# Patient Record
Sex: FEMALE | Race: WHITE | NOT HISPANIC OR LATINO | URBAN - METROPOLITAN AREA
[De-identification: names, ages, dates, MRNs, and addresses within clinical notes are randomized per-mention and may not be internally consistent; named-entity substitution may affect disease eponyms.]

---

## 2017-01-25 ENCOUNTER — OUTPATIENT (OUTPATIENT)
Dept: OUTPATIENT SERVICES | Facility: HOSPITAL | Age: 62
LOS: 1 days | End: 2017-01-25
Payer: COMMERCIAL

## 2017-01-25 PROCEDURE — 76536 US EXAM OF HEAD AND NECK: CPT | Mod: 26

## 2017-01-25 PROCEDURE — 76536 US EXAM OF HEAD AND NECK: CPT

## 2017-02-22 ENCOUNTER — APPOINTMENT (OUTPATIENT)
Dept: CT IMAGING | Facility: HOSPITAL | Age: 62
End: 2017-02-22

## 2017-02-22 ENCOUNTER — OUTPATIENT (OUTPATIENT)
Dept: OUTPATIENT SERVICES | Facility: HOSPITAL | Age: 62
LOS: 1 days | End: 2017-02-22
Payer: COMMERCIAL

## 2017-02-22 PROCEDURE — 74150 CT ABDOMEN W/O CONTRAST: CPT

## 2017-03-10 ENCOUNTER — MEDICATION RENEWAL (OUTPATIENT)
Age: 62
End: 2017-03-10

## 2017-11-07 ENCOUNTER — APPOINTMENT (OUTPATIENT)
Dept: ENDOCRINOLOGY | Facility: CLINIC | Age: 62
End: 2017-11-07
Payer: COMMERCIAL

## 2017-11-07 VITALS
HEART RATE: 78 BPM | SYSTOLIC BLOOD PRESSURE: 128 MMHG | BODY MASS INDEX: 45.36 KG/M2 | HEIGHT: 59.5 IN | WEIGHT: 228 LBS | DIASTOLIC BLOOD PRESSURE: 80 MMHG | OXYGEN SATURATION: 98 %

## 2017-11-07 PROCEDURE — 99214 OFFICE O/P EST MOD 30 MIN: CPT

## 2017-11-08 LAB
25(OH)D3 SERPL-MCNC: 33 NG/ML
ALBUMIN SERPL ELPH-MCNC: 4.3 G/DL
ALP BLD-CCNC: 86 U/L
ALT SERPL-CCNC: 166 U/L
ANION GAP SERPL CALC-SCNC: 15 MMOL/L
AST SERPL-CCNC: 127 U/L
BASOPHILS # BLD AUTO: 0.03 K/UL
BASOPHILS NFR BLD AUTO: 0.3 %
BILIRUB SERPL-MCNC: 0.4 MG/DL
BUN SERPL-MCNC: 11 MG/DL
CALCIUM SERPL-MCNC: 10.2 MG/DL
CHLORIDE SERPL-SCNC: 103 MMOL/L
CHOLEST SERPL-MCNC: 216 MG/DL
CHOLEST/HDLC SERPL: 3.6 RATIO
CO2 SERPL-SCNC: 26 MMOL/L
CREAT SERPL-MCNC: 0.54 MG/DL
EOSINOPHIL # BLD AUTO: 0.13 K/UL
EOSINOPHIL NFR BLD AUTO: 1.4 %
GLUCOSE SERPL-MCNC: 89 MG/DL
HBA1C MFR BLD HPLC: 6.4 %
HCT VFR BLD CALC: 43.2 %
HDLC SERPL-MCNC: 60 MG/DL
HGB BLD-MCNC: 13.9 G/DL
IMM GRANULOCYTES NFR BLD AUTO: 0.3 %
LDLC SERPL CALC-MCNC: 142 MG/DL
LYMPHOCYTES # BLD AUTO: 3.85 K/UL
LYMPHOCYTES NFR BLD AUTO: 40.8 %
MAN DIFF?: NORMAL
MCHC RBC-ENTMCNC: 29.1 PG
MCHC RBC-ENTMCNC: 32.2 GM/DL
MCV RBC AUTO: 90.4 FL
MONOCYTES # BLD AUTO: 0.57 K/UL
MONOCYTES NFR BLD AUTO: 6 %
NEUTROPHILS # BLD AUTO: 4.82 K/UL
NEUTROPHILS NFR BLD AUTO: 51.2 %
PLATELET # BLD AUTO: 224 K/UL
POTASSIUM SERPL-SCNC: 4.6 MMOL/L
PROT SERPL-MCNC: 7.2 G/DL
RBC # BLD: 4.78 M/UL
RBC # FLD: 14.4 %
SODIUM SERPL-SCNC: 144 MMOL/L
T3 SERPL-MCNC: 162 NG/DL
T4 FREE SERPL-MCNC: 1.2 NG/DL
TRIGL SERPL-MCNC: 69 MG/DL
TSH SERPL-ACNC: 0.42 UIU/ML
VIT B12 SERPL-MCNC: 636 PG/ML
WBC # FLD AUTO: 9.43 K/UL

## 2018-03-23 ENCOUNTER — MEDICATION RENEWAL (OUTPATIENT)
Age: 63
End: 2018-03-23

## 2018-03-27 ENCOUNTER — MEDICATION RENEWAL (OUTPATIENT)
Age: 63
End: 2018-03-27

## 2018-05-07 ENCOUNTER — APPOINTMENT (OUTPATIENT)
Dept: ORTHOPEDIC SURGERY | Facility: CLINIC | Age: 63
End: 2018-05-07
Payer: COMMERCIAL

## 2018-05-07 VITALS
HEART RATE: 67 BPM | SYSTOLIC BLOOD PRESSURE: 144 MMHG | BODY MASS INDEX: 45.16 KG/M2 | DIASTOLIC BLOOD PRESSURE: 77 MMHG | HEIGHT: 60 IN | WEIGHT: 230 LBS

## 2018-05-07 DIAGNOSIS — Z78.9 OTHER SPECIFIED HEALTH STATUS: ICD-10-CM

## 2018-05-07 DIAGNOSIS — Z86.39 PERSONAL HISTORY OF OTHER ENDOCRINE, NUTRITIONAL AND METABOLIC DISEASE: ICD-10-CM

## 2018-05-07 PROCEDURE — 99214 OFFICE O/P EST MOD 30 MIN: CPT

## 2018-05-07 PROCEDURE — 72100 X-RAY EXAM L-S SPINE 2/3 VWS: CPT

## 2018-06-02 ENCOUNTER — MOBILE ON CALL (OUTPATIENT)
Age: 63
End: 2018-06-02

## 2018-06-04 ENCOUNTER — APPOINTMENT (OUTPATIENT)
Dept: ORTHOPEDIC SURGERY | Facility: CLINIC | Age: 63
End: 2018-06-04
Payer: COMMERCIAL

## 2018-06-04 DIAGNOSIS — M51.36 OTHER INTERVERTEBRAL DISC DEGENERATION, LUMBAR REGION: ICD-10-CM

## 2018-06-04 PROCEDURE — 99214 OFFICE O/P EST MOD 30 MIN: CPT

## 2018-06-13 ENCOUNTER — MEDICATION RENEWAL (OUTPATIENT)
Age: 63
End: 2018-06-13

## 2018-08-13 ENCOUNTER — APPOINTMENT (OUTPATIENT)
Dept: ORTHOPEDIC SURGERY | Facility: CLINIC | Age: 63
End: 2018-08-13

## 2018-08-31 ENCOUNTER — CLINICAL ADVICE (OUTPATIENT)
Age: 63
End: 2018-08-31

## 2018-09-11 ENCOUNTER — APPOINTMENT (OUTPATIENT)
Dept: ENDOCRINOLOGY | Facility: CLINIC | Age: 63
End: 2018-09-11
Payer: COMMERCIAL

## 2018-09-11 VITALS
OXYGEN SATURATION: 97 % | SYSTOLIC BLOOD PRESSURE: 140 MMHG | HEIGHT: 60 IN | DIASTOLIC BLOOD PRESSURE: 90 MMHG | WEIGHT: 236 LBS | HEART RATE: 82 BPM | BODY MASS INDEX: 46.33 KG/M2

## 2018-09-11 DIAGNOSIS — R53.83 OTHER FATIGUE: ICD-10-CM

## 2018-09-11 PROCEDURE — 99214 OFFICE O/P EST MOD 30 MIN: CPT

## 2018-09-18 LAB
25(OH)D3 SERPL-MCNC: 33.9 NG/ML
ALBUMIN SERPL ELPH-MCNC: 4.2 G/DL
ALDOSTERONE SERUM: 6.5 NG/DL
ALP BLD-CCNC: 78 U/L
ALT SERPL-CCNC: 101 U/L
ANION GAP SERPL CALC-SCNC: 13 MMOL/L
AST SERPL-CCNC: 83 U/L
BASOPHILS # BLD AUTO: 0.02 K/UL
BASOPHILS NFR BLD AUTO: 0.3 %
BILIRUB SERPL-MCNC: 0.4 MG/DL
BUN SERPL-MCNC: 9 MG/DL
CALCIUM SERPL-MCNC: 10.1 MG/DL
CHLORIDE SERPL-SCNC: 102 MMOL/L
CHOLEST SERPL-MCNC: 186 MG/DL
CHOLEST/HDLC SERPL: 3.7 RATIO
CO2 SERPL-SCNC: 26 MMOL/L
CREAT SERPL-MCNC: 0.5 MG/DL
DHEA-S SERPL-MCNC: 24 UG/DL
EOSINOPHIL # BLD AUTO: 0.17 K/UL
EOSINOPHIL NFR BLD AUTO: 2.2 %
FERRITIN SERPL-MCNC: 275 NG/ML
GLUCOSE SERPL-MCNC: 97 MG/DL
HBA1C MFR BLD HPLC: 6.4 %
HCT VFR BLD CALC: 42.7 %
HDLC SERPL-MCNC: 50 MG/DL
HGB BLD-MCNC: 14.2 G/DL
IMM GRANULOCYTES NFR BLD AUTO: 0.3 %
IRON SATN MFR SERPL: 26 %
IRON SERPL-MCNC: 71 UG/DL
LDLC SERPL CALC-MCNC: 121 MG/DL
LYMPHOCYTES # BLD AUTO: 3.19 K/UL
LYMPHOCYTES NFR BLD AUTO: 41.4 %
MAN DIFF?: NORMAL
MCHC RBC-ENTMCNC: 30 PG
MCHC RBC-ENTMCNC: 33.3 GM/DL
MCV RBC AUTO: 90.1 FL
METANEPHRINE, PL: 10 PG/ML
MONOCYTES # BLD AUTO: 0.4 K/UL
MONOCYTES NFR BLD AUTO: 5.2 %
NEUTROPHILS # BLD AUTO: 3.91 K/UL
NEUTROPHILS NFR BLD AUTO: 50.6 %
NORMETANEPHRINE, PL: 92 PG/ML
PLATELET # BLD AUTO: 210 K/UL
POTASSIUM SERPL-SCNC: 4.1 MMOL/L
PROT SERPL-MCNC: 7.5 G/DL
RBC # BLD: 4.74 M/UL
RBC # FLD: 14.6 %
RENIN PLASMA: <2.1 PG/ML
SODIUM SERPL-SCNC: 141 MMOL/L
T4 FREE SERPL-MCNC: 1.6 NG/DL
TIBC SERPL-MCNC: 269 UG/DL
TRIGL SERPL-MCNC: 77 MG/DL
TSH SERPL-ACNC: 0.12 UIU/ML
UIBC SERPL-MCNC: 198 UG/DL
VIT B12 SERPL-MCNC: 540 PG/ML
WBC # FLD AUTO: 7.71 K/UL

## 2018-09-26 ENCOUNTER — MEDICATION RENEWAL (OUTPATIENT)
Age: 63
End: 2018-09-26

## 2019-03-13 ENCOUNTER — APPOINTMENT (OUTPATIENT)
Dept: ENDOCRINOLOGY | Facility: CLINIC | Age: 64
End: 2019-03-13

## 2019-12-16 ENCOUNTER — MEDICATION RENEWAL (OUTPATIENT)
Age: 64
End: 2019-12-16

## 2020-06-03 ENCOUNTER — APPOINTMENT (OUTPATIENT)
Dept: ENDOCRINOLOGY | Facility: CLINIC | Age: 65
End: 2020-06-03
Payer: COMMERCIAL

## 2020-06-03 VITALS
TEMPERATURE: 98.3 F | BODY MASS INDEX: 47.12 KG/M2 | SYSTOLIC BLOOD PRESSURE: 140 MMHG | WEIGHT: 240 LBS | OXYGEN SATURATION: 96 % | DIASTOLIC BLOOD PRESSURE: 80 MMHG | HEART RATE: 74 BPM | HEIGHT: 60 IN

## 2020-06-03 DIAGNOSIS — R73.09 OTHER ABNORMAL GLUCOSE: ICD-10-CM

## 2020-06-03 PROCEDURE — 99214 OFFICE O/P EST MOD 30 MIN: CPT

## 2020-06-03 NOTE — HISTORY OF PRESENT ILLNESS
[FreeTextEntry1] : 65 y.o.female with h/o hypothyroidism, prediabetes and thyroid nodule here for follow up visit. Had fall on November 11th 2016 and broke right collar bone. No neck complaints. Was taking [place] thyroid 30 mg daily and switched to Synthroid 75 mcg daily since March 2018. Had FNA in March 2014 of left dominant 3.9 cm nodule c/w adenomatous nodular goiter. Last sonogram in January 2017 showed stable 3.7 cm left complex thyroid nodule. C/o fatigue. Reports cold intolerance. Normal bowel movements. No hair loss and no skin complaints. Reports puffiness around the eyes at night.  No exercise. C/o right low back pain. Tried PT but no help. \par \par Also has prediabetes. Reports watches diet and but not losing weight. No exercise. Reduced portions. \par \par Also diagnosed with right adrenal nodule measuring 4.3 cm c/w adrenal myolipoma on MRI from 5/8/15 and normal hormonal work up in 2015 and 2018. Had CT scan in 2/2017 showed stable right adrenal adenoma 3.5 cm. \par \par Also h/o osteopenia, no fractures recently. Taking vitamin D3 5,000 IU every few days. Eats leafy greens. No dairy. \par

## 2020-06-03 NOTE — REVIEW OF SYSTEMS
[Fatigue] : fatigue [Recent Weight Gain (___ Lbs)] : recent weight gain: [unfilled] lbs [Polyuria] : no polyuria [Back Pain] : back pain [Hair Loss] : no hair loss [Dizziness] : dizziness [Polydipsia] : no polydipsia [Cold Intolerance] : cold intolerance [Swelling] : swelling [Negative] : Respiratory

## 2020-06-03 NOTE — ASSESSMENT
[FreeTextEntry1] : 65 y.o. female with h/o hypothyroidism, thyroid nodule, prediabetes, adrenal nodule and osteopenia.\par 1. Hypothyroidism- Will check TFTs and adjust Synthroid accordingly.\par 2. Thyroid nodule- Will check thyroid ultrasound. If stable, will continue to monitor.\par 3. Prediabetes- Encouraged carbohydrate consistent diet and exercise. Will check CMP and HBa1c. \par 4. Adrenal nodule- Hormonal work up is normal. Will repeat CT scan now.\par 5. Osteopenia- DEXA scan performed in 12/2016 shows spine -1.3 and left femoral neck -0.4 with total hip 0.9. Patient is at average risk of fracture. Recommend monitoring and repeat DEXA scan at next visit since patient unable to wait today. Encouraged weight bearing activity. \par 6. Fatigue- Will check CBC, vitamin B12 and vitamin D. \par \par Follow up in 6 months, if stable\par Follow up with ortho for back pain [Carbohydrate Consistent Diet] : carbohydrate consistent diet

## 2020-06-03 NOTE — REASON FOR VISIT
[Follow - Up] : a follow-up visit [Hypothyroidism] : hypothyroidism [Thyroid nodule/ MNG] : thyroid nodule/ MNG

## 2020-06-03 NOTE — PHYSICAL EXAM
[Alert] : alert [No Acute Distress] : no acute distress [Normal Sclera/Conjunctiva] : normal sclera/conjunctiva [EOMI] : extra ocular movement intact [No LAD] : no lymphadenopathy [No Respiratory Distress] : no respiratory distress [Normal S1, S2] : normal S1 and S2 [Clear to Auscultation] : lungs were clear to auscultation bilaterally [Normal Bowel Sounds] : normal bowel sounds [Regular Rhythm] : with a regular rhythm [Not Tender] : non-tender [Not Distended] : not distended [Soft] : abdomen soft [Normal Anterior Cervical Nodes] : no anterior cervical lymphadenopathy [Kyphosis] : no kyphosis present [No Stigmata of Cushings Syndrome] : no stigmata of Cushings Syndrome [No Clubbing, Cyanosis] : no clubbing  or cyanosis of the fingernails [No Rash] : no rash [Abdominal Striae] : no abdominal striae [Acanthosis Nigricans] : no acanthosis nigricans [Normal Reflexes] : deep tendon reflexes were 2+ and symmetric [Normal Affect] : the affect was normal [Normal Mood] : the mood was normal [de-identified] : left lobe nodule [de-identified] : mild edema b/l

## 2020-06-08 LAB
25(OH)D3 SERPL-MCNC: 41.2 NG/ML
ALBUMIN SERPL ELPH-MCNC: 4.4 G/DL
ALP BLD-CCNC: 96 U/L
ALT SERPL-CCNC: 120 U/L
ANION GAP SERPL CALC-SCNC: 13 MMOL/L
AST SERPL-CCNC: 85 U/L
BASOPHILS # BLD AUTO: 0.05 K/UL
BASOPHILS NFR BLD AUTO: 0.5 %
BILIRUB SERPL-MCNC: 0.5 MG/DL
BUN SERPL-MCNC: 13 MG/DL
CALCIUM SERPL-MCNC: 10.3 MG/DL
CHLORIDE SERPL-SCNC: 103 MMOL/L
CHOLEST SERPL-MCNC: 208 MG/DL
CHOLEST/HDLC SERPL: 3.6 RATIO
CO2 SERPL-SCNC: 24 MMOL/L
CREAT SERPL-MCNC: 0.53 MG/DL
EOSINOPHIL # BLD AUTO: 0.16 K/UL
EOSINOPHIL NFR BLD AUTO: 1.6 %
ESTIMATED AVERAGE GLUCOSE: 157 MG/DL
FERRITIN SERPL-MCNC: 245 NG/ML
GLUCOSE SERPL-MCNC: 119 MG/DL
HBA1C MFR BLD HPLC: 7.1 %
HCT VFR BLD CALC: 47.1 %
HDLC SERPL-MCNC: 58 MG/DL
HGB BLD-MCNC: 14.8 G/DL
IMM GRANULOCYTES NFR BLD AUTO: 0.2 %
IRON SATN MFR SERPL: 26 %
IRON SERPL-MCNC: 78 UG/DL
LDLC SERPL CALC-MCNC: 136 MG/DL
LYMPHOCYTES # BLD AUTO: 4.21 K/UL
LYMPHOCYTES NFR BLD AUTO: 41.8 %
MAGNESIUM SERPL-MCNC: 2.1 MG/DL
MAN DIFF?: NORMAL
MCHC RBC-ENTMCNC: 28.9 PG
MCHC RBC-ENTMCNC: 31.4 GM/DL
MCV RBC AUTO: 92 FL
MONOCYTES # BLD AUTO: 0.62 K/UL
MONOCYTES NFR BLD AUTO: 6.2 %
NEUTROPHILS # BLD AUTO: 5.01 K/UL
NEUTROPHILS NFR BLD AUTO: 49.7 %
PLATELET # BLD AUTO: 239 K/UL
POTASSIUM SERPL-SCNC: 4.7 MMOL/L
PROT SERPL-MCNC: 7.2 G/DL
RBC # BLD: 5.12 M/UL
RBC # FLD: 14.5 %
SODIUM SERPL-SCNC: 140 MMOL/L
T4 FREE SERPL-MCNC: 1.5 NG/DL
TIBC SERPL-MCNC: 298 UG/DL
TRIGL SERPL-MCNC: 75 MG/DL
TSH SERPL-ACNC: 0.17 UIU/ML
UIBC SERPL-MCNC: 219 UG/DL
VIT B12 SERPL-MCNC: 590 PG/ML
WBC # FLD AUTO: 10.07 K/UL

## 2020-12-09 ENCOUNTER — APPOINTMENT (OUTPATIENT)
Dept: ENDOCRINOLOGY | Facility: CLINIC | Age: 65
End: 2020-12-09

## 2021-04-23 ENCOUNTER — APPOINTMENT (OUTPATIENT)
Dept: ENDOCRINOLOGY | Facility: CLINIC | Age: 66
End: 2021-04-23
Payer: MEDICARE

## 2021-04-23 VITALS — OXYGEN SATURATION: 97 % | SYSTOLIC BLOOD PRESSURE: 140 MMHG | HEART RATE: 81 BPM | DIASTOLIC BLOOD PRESSURE: 80 MMHG

## 2021-04-23 VITALS — HEIGHT: 58.5 IN | BODY MASS INDEX: 49.85 KG/M2 | TEMPERATURE: 98.2 F | WEIGHT: 244 LBS

## 2021-04-23 LAB
GLUCOSE BLDC GLUCOMTR-MCNC: 113
HBA1C MFR BLD HPLC: 7.3

## 2021-04-23 PROCEDURE — ZZZZZ: CPT

## 2021-04-23 PROCEDURE — 83036 HEMOGLOBIN GLYCOSYLATED A1C: CPT | Mod: QW

## 2021-04-23 PROCEDURE — 77080 DXA BONE DENSITY AXIAL: CPT

## 2021-04-23 PROCEDURE — 99214 OFFICE O/P EST MOD 30 MIN: CPT | Mod: 25

## 2021-04-23 PROCEDURE — 82962 GLUCOSE BLOOD TEST: CPT

## 2021-04-23 RX ORDER — MELOXICAM 15 MG/1
15 TABLET ORAL
Qty: 30 | Refills: 1 | Status: DISCONTINUED | COMMUNITY
Start: 2018-05-07 | End: 2021-04-23

## 2021-04-23 NOTE — PHYSICAL EXAM
[Alert] : alert [No Acute Distress] : no acute distress [Normal Sclera/Conjunctiva] : normal sclera/conjunctiva [EOMI] : extra ocular movement intact [No LAD] : no lymphadenopathy [No Respiratory Distress] : no respiratory distress [Clear to Auscultation] : lungs were clear to auscultation bilaterally [Normal S1, S2] : normal S1 and S2 [Regular Rhythm] : with a regular rhythm [Normal Bowel Sounds] : normal bowel sounds [Not Tender] : non-tender [Not Distended] : not distended [Soft] : abdomen soft [Normal Anterior Cervical Nodes] : no anterior cervical lymphadenopathy [No Stigmata of Cushings Syndrome] : no stigmata of Cushings Syndrome [No Clubbing, Cyanosis] : no clubbing  or cyanosis of the fingernails [No Rash] : no rash [Normal Reflexes] : deep tendon reflexes were 2+ and symmetric [Normal Affect] : the affect was normal [Normal Mood] : the mood was normal [No Spinal Tenderness] : no spinal tenderness [Right Foot Was Examined] : right foot ~C was examined [Left Foot Was Examined] : left foot ~C was examined [Normal] : normal [2+] : 2+ in the dorsalis pedis [Kyphosis] : no kyphosis present [Abdominal Striae] : no abdominal striae [Acanthosis Nigricans] : no acanthosis nigricans [Diminished Throughout Both Feet] : normal tactile sensation with monofilament testing throughout both feet [de-identified] : left lobe nodule [de-identified] : mild edema b/l

## 2021-04-23 NOTE — ASSESSMENT
[Carbohydrate Consistent Diet] : carbohydrate consistent diet [Diabetes Foot Care] : diabetes foot care [Long Term Vascular Complications] : long term vascular complications of diabetes [Self Monitoring of Blood Glucose] : self monitoring of blood glucose [Retinopathy Screening] : Patient was referred to ophthalmology for retinopathy screening [FreeTextEntry1] : 66 y.o. female with h/o hypothyroidism, thyroid nodule, Type 2 DM, adrenal nodule and osteopenia.\par \par 1. Hypothyroidism- Will check TFTs and adjust Synthroid accordingly.\par \par 2. Thyroid nodule- Will check thyroid ultrasound. If stable, will continue to monitor.\par \par 3. Type 2 DM- Discussed pathophysiology and reviewed Hba1c and blood glucose goals. Fair control with Hba1c of 7.3% today. Glucometer ordered today. Recommend starting Metformin 500 mg BID. Encouraged carbohydrate consistent diet and exercise. Will check CMP and urine microalb/cr ratio. Recommend follow up with ophthalmology. \par \par 4. Adrenal nodule- Hormonal work up is normal. Will repeat CT scan now.\par \par 5. Osteopenia- DEXA scan performed today shows spine -1.3 which is stable and left femoral neck -1.9 with 20% decrease however  total hip 0.8 which is stable. Patient is at average risk of fracture. Recommend monitoring and repeat DEXA scan in 1 year given more significant decrease at femoral though given stability of total hip unlikely this is accurate. Encouraged weight bearing activity. Discussed appropriate calcium and vitamin D intake. Will check 25 vitamin D level.\par \par 6. Fatigue- Will check CBC and vitamin B12 \par \par Follow up in 4 months

## 2021-04-23 NOTE — HISTORY OF PRESENT ILLNESS
[FreeTextEntry1] : 65 y.o.female with h/o hypothyroidism, Type 2 DM diagnosed in June 2020 and thyroid nodule here for follow up visit. Had fall on November 11th 2016 and broke right collar bone. No neck complaints. Was taking [place] thyroid 30 mg daily and switched to Synthroid 75 mcg daily 6 days per week. She developed hives with generic Synthroid. Had FNA in March 2014 of left dominant 3.9 cm nodule c/w adenomatous nodular goiter. Last sonogram in January 2017 showed stable 3.7 cm left complex thyroid nodule. C/o fatigue. Reports cold intolerance. Normal bowel movements. No hair loss and no skin complaints.  C/o right low back pain. Tried PT but no help. \par \par Also has Type 2 DM diagnosed in June 2020. Never started metformin. Reports watches diet and but not losing weight. No exercise. No bread. Past due for ophthalmology. No foot complaints. \par \par Also diagnosed with right adrenal nodule measuring 4.3 cm c/w adrenal myolipoma on MRI from 5/8/15 and normal hormonal work up in 2015 and 2018. Had CT scan in 2/2017 showed stable right adrenal adenoma 3.5 cm. \par \par Also h/o osteopenia, no falls or fractures recently. Taking vitamin D3 5,000 IU daily. Eats leafy greens. No dairy.  \par DEXA scan performed in 12/2016 shows spine -1.3 and left femoral neck -0.4 with total hip 0.9.\par

## 2021-04-28 LAB
BASOPHILS # BLD AUTO: 0.06 K/UL
BASOPHILS NFR BLD AUTO: 0.7 %
EOSINOPHIL # BLD AUTO: 0.18 K/UL
EOSINOPHIL NFR BLD AUTO: 2 %
HCT VFR BLD CALC: 48.1 %
HGB BLD-MCNC: 14.7 G/DL
IMM GRANULOCYTES NFR BLD AUTO: 0.3 %
LYMPHOCYTES # BLD AUTO: 3.53 K/UL
LYMPHOCYTES NFR BLD AUTO: 39.8 %
MAN DIFF?: NORMAL
MCHC RBC-ENTMCNC: 28.6 PG
MCHC RBC-ENTMCNC: 30.6 GM/DL
MCV RBC AUTO: 93.6 FL
MONOCYTES # BLD AUTO: 0.58 K/UL
MONOCYTES NFR BLD AUTO: 6.5 %
NEUTROPHILS # BLD AUTO: 4.49 K/UL
NEUTROPHILS NFR BLD AUTO: 50.7 %
PLATELET # BLD AUTO: 234 K/UL
RBC # BLD: 5.14 M/UL
RBC # FLD: 14.7 %
WBC # FLD AUTO: 8.87 K/UL

## 2021-04-28 RX ORDER — BLOOD SUGAR DIAGNOSTIC
STRIP MISCELLANEOUS
Qty: 100 | Refills: 3 | Status: ACTIVE | COMMUNITY
Start: 2021-04-23 | End: 1900-01-01

## 2021-04-28 RX ORDER — BLOOD-GLUCOSE METER
W/DEVICE EACH MISCELLANEOUS
Qty: 1 | Refills: 0 | Status: ACTIVE | COMMUNITY
Start: 2021-04-23 | End: 1900-01-01

## 2021-04-28 RX ORDER — LANCETS
EACH MISCELLANEOUS
Qty: 102 | Refills: 3 | Status: ACTIVE | COMMUNITY
Start: 2021-04-23 | End: 1900-01-01

## 2021-05-03 ENCOUNTER — RESULT REVIEW (OUTPATIENT)
Age: 66
End: 2021-05-03

## 2021-05-03 ENCOUNTER — NON-APPOINTMENT (OUTPATIENT)
Age: 66
End: 2021-05-03

## 2021-05-03 ENCOUNTER — APPOINTMENT (OUTPATIENT)
Dept: ULTRASOUND IMAGING | Facility: HOSPITAL | Age: 66
End: 2021-05-03
Payer: MEDICARE

## 2021-05-03 ENCOUNTER — APPOINTMENT (OUTPATIENT)
Dept: CT IMAGING | Facility: HOSPITAL | Age: 66
End: 2021-05-03
Payer: MEDICARE

## 2021-05-03 ENCOUNTER — OUTPATIENT (OUTPATIENT)
Dept: OUTPATIENT SERVICES | Facility: HOSPITAL | Age: 66
LOS: 1 days | End: 2021-05-03
Payer: MEDICARE

## 2021-05-03 DIAGNOSIS — Z00.8 ENCOUNTER FOR OTHER GENERAL EXAMINATION: ICD-10-CM

## 2021-05-03 PROCEDURE — 76536 US EXAM OF HEAD AND NECK: CPT | Mod: 26

## 2021-05-03 PROCEDURE — 74150 CT ABDOMEN W/O CONTRAST: CPT | Mod: 26,MH

## 2021-05-03 PROCEDURE — 74150 CT ABDOMEN W/O CONTRAST: CPT

## 2021-05-03 PROCEDURE — 76536 US EXAM OF HEAD AND NECK: CPT

## 2021-10-10 ENCOUNTER — TRANSCRIPTION ENCOUNTER (OUTPATIENT)
Age: 66
End: 2021-10-10

## 2021-10-30 ENCOUNTER — TRANSCRIPTION ENCOUNTER (OUTPATIENT)
Age: 66
End: 2021-10-30

## 2021-11-17 ENCOUNTER — APPOINTMENT (OUTPATIENT)
Dept: ENDOCRINOLOGY | Facility: CLINIC | Age: 66
End: 2021-11-17
Payer: MEDICARE

## 2021-11-17 VITALS
HEART RATE: 79 BPM | DIASTOLIC BLOOD PRESSURE: 88 MMHG | SYSTOLIC BLOOD PRESSURE: 128 MMHG | BODY MASS INDEX: 48.49 KG/M2 | WEIGHT: 236 LBS | OXYGEN SATURATION: 98 % | TEMPERATURE: 98.1 F

## 2021-11-17 LAB — HBA1C MFR BLD HPLC: 6.3

## 2021-11-17 PROCEDURE — 99215 OFFICE O/P EST HI 40 MIN: CPT | Mod: 25

## 2021-11-17 PROCEDURE — 83036 HEMOGLOBIN GLYCOSYLATED A1C: CPT | Mod: QW

## 2021-11-17 NOTE — PHYSICAL EXAM
[Alert] : alert [No Acute Distress] : no acute distress [Normal Sclera/Conjunctiva] : normal sclera/conjunctiva [EOMI] : extra ocular movement intact [No LAD] : no lymphadenopathy [No Respiratory Distress] : no respiratory distress [Clear to Auscultation] : lungs were clear to auscultation bilaterally [Normal S1, S2] : normal S1 and S2 [Regular Rhythm] : with a regular rhythm [Normal Bowel Sounds] : normal bowel sounds [Not Tender] : non-tender [Not Distended] : not distended [Soft] : abdomen soft [Normal Anterior Cervical Nodes] : no anterior cervical lymphadenopathy [No Spinal Tenderness] : no spinal tenderness [No Stigmata of Cushings Syndrome] : no stigmata of Cushings Syndrome [No Clubbing, Cyanosis] : no clubbing  or cyanosis of the fingernails [No Rash] : no rash [Right foot was examined, including] : right foot ~C was examined, including visual inspection with sensory and pulse exams [Left foot was examined, including] : left foot ~C was examined, including visual inspection with sensory and pulse exams [Normal] : normal [2+] : 2+ in the dorsalis pedis [Normal Reflexes] : deep tendon reflexes were 2+ and symmetric [Normal Affect] : the affect was normal [Normal Mood] : the mood was normal [Kyphosis] : no kyphosis present [Abdominal Striae] : no abdominal striae [Acanthosis Nigricans] : no acanthosis nigricans [Diminished Throughout Both Feet] : normal tactile sensation with monofilament testing throughout both feet [de-identified] : left thyroid lobe nodule [de-identified] : mild edema b/l

## 2021-11-17 NOTE — REVIEW OF SYSTEMS
[Fatigue] : fatigue [Back Pain] : back pain [Cold Intolerance] : cold intolerance [Swelling] : swelling [Recent Weight Gain (___ Lbs)] : no recent weight gain [Recent Weight Loss (___ Lbs)] : recent weight loss: [unfilled] lbs [Eye Pain] : pain [Polyuria] : no polyuria [Hair Loss] : no hair loss [Polydipsia] : no polydipsia [Negative] : Neurological

## 2021-11-17 NOTE — HISTORY OF PRESENT ILLNESS
[FreeTextEntry1] : 66 y.o.female with h/o hypothyroidism, Type 2 DM diagnosed in June 2020 and thyroid nodule here for follow up visit. Had fall on November 11th 2016 and broke right collar bone. No neck complaints. Was taking [place] thyroid 30 mg daily and switched to Synthroid 75 mcg daily 6 days per week. She developed hives with generic Synthroid. Had FNA in March 2014 of left dominant 3.9 cm nodule c/w adenomatous nodular goiter. Thyroid ultrasound in January 2017 showed stable 3.7 cm left complex thyroid nodule. Thyroid ultrasound in May 2021 showed stable left mid to lower pole nodule measuring 3.5 cm. No abnormal LNs are seen. . C/o fatigue. Reports cold intolerance. Normal bowel movements. No hair loss and no skin complaints.  C/o right low back pain. Tried PT but no help. \par \par Also has Type 2 DM diagnosed in June 2020. Taking metformin 500 mg po daily. Does intermittent fasting. Reports watches diet and did lose weight. No exercise. No bread. Past due for ophthalmology. Does report eye lid pain b/l. No foot complaints. \par \par Also diagnosed with right adrenal nodule measuring 4.3 cm c/w adrenal myolipoma on MRI from 5/8/15 and normal hormonal work up in 2015 and 2018. Had CT scan in 2/2017 showed stable right adrenal adenoma 3.5 cm. had CT scan in May 2021 showed stable right 3.5 cm adrenal lesion consistent with myelolipoma and stable right 1.4 cm cortical adenoma. \par \par Also h/o osteopenia, no falls or fractures recently. Stop taking vitamin D3 5,000 IU daily because of back pain. Eats leafy greens. No dairy.  \par DEXA scan performed in 12/2016 shows spine -1.3 and left femoral neck -0.4 with total hip 0.9.\par DEXA scan performed in [address]21 shows spine -1.3 which is stable and left femoral neck -1.9 with 20% decrease however total hip 0.8 is stable.

## 2021-11-22 LAB
25(OH)D3 SERPL-MCNC: 27.8 NG/ML
ALBUMIN SERPL ELPH-MCNC: 4.3 G/DL
ALP BLD-CCNC: 89 U/L
ALT SERPL-CCNC: 70 U/L
ANION GAP SERPL CALC-SCNC: 12 MMOL/L
AST SERPL-CCNC: 49 U/L
BASOPHILS # BLD AUTO: 0.05 K/UL
BASOPHILS NFR BLD AUTO: 0.5 %
BILIRUB SERPL-MCNC: 0.2 MG/DL
BUN SERPL-MCNC: 18 MG/DL
CALCIUM SERPL-MCNC: 10 MG/DL
CHLORIDE SERPL-SCNC: 104 MMOL/L
CHOLEST SERPL-MCNC: 210 MG/DL
CO2 SERPL-SCNC: 23 MMOL/L
CREAT SERPL-MCNC: 0.67 MG/DL
CREAT SPEC-SCNC: 27 MG/DL
EOSINOPHIL # BLD AUTO: 0.21 K/UL
EOSINOPHIL NFR BLD AUTO: 2 %
FOLATE SERPL-MCNC: 18.5 NG/ML
GLUCOSE SERPL-MCNC: 112 MG/DL
HCT VFR BLD CALC: 45.1 %
HDLC SERPL-MCNC: 57 MG/DL
HGB BLD-MCNC: 14.1 G/DL
IMM GRANULOCYTES NFR BLD AUTO: 0.3 %
LDLC SERPL CALC-MCNC: 133 MG/DL
LYMPHOCYTES # BLD AUTO: 4.1 K/UL
LYMPHOCYTES NFR BLD AUTO: 38.9 %
MAN DIFF?: NORMAL
MCHC RBC-ENTMCNC: 28.5 PG
MCHC RBC-ENTMCNC: 31.3 GM/DL
MCV RBC AUTO: 91.1 FL
MICROALBUMIN 24H UR DL<=1MG/L-MCNC: <1.2 MG/DL
MICROALBUMIN/CREAT 24H UR-RTO: NORMAL MG/G
MONOCYTES # BLD AUTO: 0.63 K/UL
MONOCYTES NFR BLD AUTO: 6 %
NEUTROPHILS # BLD AUTO: 5.52 K/UL
NEUTROPHILS NFR BLD AUTO: 52.3 %
NONHDLC SERPL-MCNC: 153 MG/DL
PLATELET # BLD AUTO: 248 K/UL
POTASSIUM SERPL-SCNC: 4.2 MMOL/L
PROT SERPL-MCNC: 6.7 G/DL
RBC # BLD: 4.95 M/UL
RBC # FLD: 15 %
SODIUM SERPL-SCNC: 139 MMOL/L
T4 FREE SERPL-MCNC: 1.4 NG/DL
TRIGL SERPL-MCNC: 103 MG/DL
TSH SERPL-ACNC: 0.57 UIU/ML
VIT B12 SERPL-MCNC: 942 PG/ML
WBC # FLD AUTO: 10.54 K/UL

## 2022-03-30 ENCOUNTER — APPOINTMENT (OUTPATIENT)
Dept: OPHTHALMOLOGY | Facility: CLINIC | Age: 67
End: 2022-03-30
Payer: MEDICARE

## 2022-03-30 ENCOUNTER — NON-APPOINTMENT (OUTPATIENT)
Age: 67
End: 2022-03-30

## 2022-03-30 PROCEDURE — 92004 COMPRE OPH EXAM NEW PT 1/>: CPT

## 2022-05-20 ENCOUNTER — APPOINTMENT (OUTPATIENT)
Dept: ENDOCRINOLOGY | Facility: CLINIC | Age: 67
End: 2022-05-20
Payer: MEDICARE

## 2022-05-20 VITALS
WEIGHT: 235 LBS | TEMPERATURE: 97.9 F | DIASTOLIC BLOOD PRESSURE: 80 MMHG | HEART RATE: 74 BPM | HEIGHT: 58.5 IN | OXYGEN SATURATION: 95 % | BODY MASS INDEX: 48.01 KG/M2 | SYSTOLIC BLOOD PRESSURE: 120 MMHG

## 2022-05-20 LAB
GLUCOSE BLDC GLUCOMTR-MCNC: 107
HBA1C MFR BLD HPLC: 6.2

## 2022-05-20 PROCEDURE — 83036 HEMOGLOBIN GLYCOSYLATED A1C: CPT | Mod: QW

## 2022-05-20 PROCEDURE — 82962 GLUCOSE BLOOD TEST: CPT

## 2022-05-20 PROCEDURE — 99214 OFFICE O/P EST MOD 30 MIN: CPT | Mod: 25

## 2022-05-21 NOTE — REVIEW OF SYSTEMS
[Fatigue] : fatigue [Recent Weight Loss (___ Lbs)] : recent weight loss: [unfilled] lbs [Eye Pain] : pain [Back Pain] : back pain [Cold Intolerance] : cold intolerance [Swelling] : swelling [Negative] : Neurological [Recent Weight Gain (___ Lbs)] : no recent weight gain [Polyuria] : no polyuria [Hair Loss] : no hair loss [Polydipsia] : no polydipsia

## 2022-05-21 NOTE — HISTORY OF PRESENT ILLNESS
[FreeTextEntry1] : 67 y.o.female with h/o hypothyroidism, Type 2 DM diagnosed in June 2020, adrenal nodule and thyroid nodule here for follow up visit. Had fall on November 11th 2016 and broke right collar bone. No neck complaints. Was taking [place] thyroid 30 mg daily and switched to Synthroid 75 mcg daily 6 days per week. She developed hives with generic Synthroid. Had FNA in March 2014 of left dominant 3.9 cm nodule c/w adenomatous nodular goiter. Thyroid ultrasound in January 2017 showed stable 3.7 cm left complex thyroid nodule. Thyroid ultrasound in May 2021 showed stable left mid to lower pole nodule measuring 3.5 cm. No abnormal LNs are seen. C/o fatigue. Reports cold intolerance. Normal bowel movements. No hair loss and no skin complaints.  C/o right low back pain. Tried PT but no help. \par \[facility] Also has Type 2 DM diagnosed in June 2020. Taking metformin 500 mg po daily (reduced dose because felt dizzy). Does intermittent fasting. Reports watches diet and did lose weight. No exercise. No bread. UTD with ophthalmology in March 2022 and no retinopathy. Does report eye lid pain b/l. No foot complaints. No proteinuria. \par \[facility] Also diagnosed with right adrenal nodule measuring 4.3 cm c/w adrenal myolipoma on MRI from 5/8/15 and normal hormonal work up in 2015 and 2018. Had CT scan in 2/2017 showed stable right adrenal adenoma 3.5 cm. She had CT scan in May 2021 showed stable right 3.5 cm adrenal lesion consistent with myelolipoma and stable right 1.4 cm cortical adenoma. \par \par Also h/o osteopenia, no falls or fractures recently. She is taking vitamin D3 5,000 IU once weekly Eats leafy greens. No dairy.  \par \par DEXA scan performed in 12/2016 shows spine -1.3 and left femoral neck -0.4 with total hip 0.9.\par DEXA scan performed in 4/2021 shows spine -1.3 which is stable and left femoral neck -1.9 with 20% decrease however total hip 0.8 is stable.

## 2022-05-21 NOTE — ASSESSMENT
[Carbohydrate Consistent Diet] : carbohydrate consistent diet [Diabetes Foot Care] : diabetes foot care [Long Term Vascular Complications] : long term vascular complications of diabetes [Self Monitoring of Blood Glucose] : self monitoring of blood glucose [Retinopathy Screening] : Patient was referred to ophthalmology for retinopathy screening [FreeTextEntry1] : 67 y.o. female with h/o hypothyroidism, thyroid nodule, Type 2 DM, adrenal nodule and osteopenia.\par \par 1. Hypothyroidism- Will check TFTs and adjust Synthroid accordingly.\par \par 2. Thyroid nodule- Will check thyroid ultrasound in 2024 given nodule stability. Will continue to monitor.\par \par 3. Type 2 DM- Discussed pathophysiology and reviewed Hba1c and blood glucose goals. Good control with Hba1c of 6.2% today. Will continue Metformin 500 mg daily. Encouraged a carbohydrate consistent diet and exercise. Will check CMP and urine alb/cr ratio. UTD with ophthalmology. \par \par 4. Adrenal nodule- Hormonal work up is normal. Will repeat CT scan in 2024.\par \par 5. Osteopenia- DEXA scan performed in April 2021 shows spine -1.3 which is stable and left femoral neck -1.9 with 20% decrease however total hip 0.8 which is stable. Patient is at average risk of fracture. Recommend monitoring and repeat DEXA scan in in 2023. Encouraged weight bearing activity. Discussed appropriate calcium and vitamin D intake. Will check 25 vitamin D level.\par \par 6. Vitamin B12 def- Will check CBC and vitamin B12 \par \par Follow up in 4 to 6 months

## 2022-05-21 NOTE — REASON FOR VISIT
[Follow - Up] : a follow-up visit [DM Type 2] : DM Type 2 [Hypothyroidism] : hypothyroidism [Thyroid nodule/ MNG] : thyroid nodule/ MNG [Adrenal Evaluation/Adrenal Disorder] : adrenal evaluation/adrenal disorder

## 2022-05-21 NOTE — PHYSICAL EXAM
[Alert] : alert [No Acute Distress] : no acute distress [Normal Sclera/Conjunctiva] : normal sclera/conjunctiva [EOMI] : extra ocular movement intact [No LAD] : no lymphadenopathy [No Respiratory Distress] : no respiratory distress [Clear to Auscultation] : lungs were clear to auscultation bilaterally [Normal S1, S2] : normal S1 and S2 [Regular Rhythm] : with a regular rhythm [Normal Bowel Sounds] : normal bowel sounds [Not Tender] : non-tender [Not Distended] : not distended [Soft] : abdomen soft [Normal Anterior Cervical Nodes] : no anterior cervical lymphadenopathy [No Spinal Tenderness] : no spinal tenderness [No Stigmata of Cushings Syndrome] : no stigmata of Cushings Syndrome [No Clubbing, Cyanosis] : no clubbing  or cyanosis of the fingernails [No Rash] : no rash [Normal] : normal [2+] : 2+ in the dorsalis pedis [Normal Reflexes] : deep tendon reflexes were 2+ and symmetric [Normal Affect] : the affect was normal [Normal Mood] : the mood was normal [Kyphosis] : no kyphosis present [Abdominal Striae] : no abdominal striae [Acanthosis Nigricans] : no acanthosis nigricans [Right foot was examined, including] : right foot ~C was examined, including visual inspection with sensory and pulse exams [Left foot was examined, including] : left foot ~C was examined, including visual inspection with sensory and pulse exams [Diminished Throughout Both Feet] : normal tactile sensation with monofilament testing throughout both feet [de-identified] : left thyroid lobe nodule [de-identified] : mild edema b/l

## 2022-05-25 LAB
25(OH)D3 SERPL-MCNC: 29.6 NG/ML
ALBUMIN SERPL ELPH-MCNC: 4.5 G/DL
ALP BLD-CCNC: 85 U/L
ALT SERPL-CCNC: 107 U/L
ANION GAP SERPL CALC-SCNC: 13 MMOL/L
AST SERPL-CCNC: 65 U/L
BASOPHILS # BLD AUTO: 0.05 K/UL
BASOPHILS NFR BLD AUTO: 0.5 %
BILIRUB SERPL-MCNC: 0.4 MG/DL
BUN SERPL-MCNC: 13 MG/DL
CALCIUM SERPL-MCNC: 10.8 MG/DL
CHLORIDE SERPL-SCNC: 105 MMOL/L
CHOLEST SERPL-MCNC: 220 MG/DL
CO2 SERPL-SCNC: 25 MMOL/L
CREAT SERPL-MCNC: 0.51 MG/DL
CREAT SPEC-SCNC: 68 MG/DL
EGFR: 103 ML/MIN/1.73M2
EOSINOPHIL # BLD AUTO: 0.26 K/UL
EOSINOPHIL NFR BLD AUTO: 2.8 %
FOLATE SERPL-MCNC: >20 NG/ML
GLUCOSE SERPL-MCNC: 98 MG/DL
HCT VFR BLD CALC: 46 %
HDLC SERPL-MCNC: 62 MG/DL
HGB BLD-MCNC: 14.3 G/DL
IMM GRANULOCYTES NFR BLD AUTO: 0.4 %
LDLC SERPL CALC-MCNC: 138 MG/DL
LYMPHOCYTES # BLD AUTO: 3.81 K/UL
LYMPHOCYTES NFR BLD AUTO: 40.5 %
MAN DIFF?: NORMAL
MCHC RBC-ENTMCNC: 28.1 PG
MCHC RBC-ENTMCNC: 31.1 GM/DL
MCV RBC AUTO: 90.4 FL
MICROALBUMIN 24H UR DL<=1MG/L-MCNC: 2.9 MG/DL
MICROALBUMIN/CREAT 24H UR-RTO: 43 MG/G
MONOCYTES # BLD AUTO: 0.54 K/UL
MONOCYTES NFR BLD AUTO: 5.7 %
NEUTROPHILS # BLD AUTO: 4.71 K/UL
NEUTROPHILS NFR BLD AUTO: 50.1 %
NONHDLC SERPL-MCNC: 158 MG/DL
PLATELET # BLD AUTO: 234 K/UL
POTASSIUM SERPL-SCNC: 4.8 MMOL/L
PROT SERPL-MCNC: 7.1 G/DL
RBC # BLD: 5.09 M/UL
RBC # FLD: 14.4 %
SODIUM SERPL-SCNC: 142 MMOL/L
T4 FREE SERPL-MCNC: 1.4 NG/DL
TRIGL SERPL-MCNC: 99 MG/DL
TSH SERPL-ACNC: 0.39 UIU/ML
VIT B12 SERPL-MCNC: 1595 PG/ML
WBC # FLD AUTO: 9.41 K/UL

## 2022-09-15 ENCOUNTER — RX RENEWAL (OUTPATIENT)
Age: 67
End: 2022-09-15

## 2022-12-04 ENCOUNTER — RX RENEWAL (OUTPATIENT)
Age: 67
End: 2022-12-04

## 2022-12-07 ENCOUNTER — APPOINTMENT (OUTPATIENT)
Dept: ENDOCRINOLOGY | Facility: CLINIC | Age: 67
End: 2022-12-07

## 2022-12-07 VITALS
WEIGHT: 230 LBS | DIASTOLIC BLOOD PRESSURE: 72 MMHG | OXYGEN SATURATION: 97 % | HEART RATE: 68 BPM | HEIGHT: 58.5 IN | BODY MASS INDEX: 46.99 KG/M2 | SYSTOLIC BLOOD PRESSURE: 134 MMHG

## 2022-12-07 DIAGNOSIS — M85.80 OTHER SPECIFIED DISORDERS OF BONE DENSITY AND STRUCTURE, UNSPECIFIED SITE: ICD-10-CM

## 2022-12-07 LAB — HBA1C MFR BLD HPLC: 6.2

## 2022-12-07 PROCEDURE — 99215 OFFICE O/P EST HI 40 MIN: CPT | Mod: 25

## 2022-12-07 PROCEDURE — 83036 HEMOGLOBIN GLYCOSYLATED A1C: CPT | Mod: QW

## 2022-12-07 NOTE — REASON FOR VISIT
[Follow - Up] : a follow-up visit [Adrenal Evaluation/Adrenal Disorder] : adrenal evaluation/adrenal disorder [DM Type 2] : DM Type 2 [Hypothyroidism] : hypothyroidism [Thyroid nodule/ MNG] : thyroid nodule/ MNG

## 2022-12-07 NOTE — ASSESSMENT
[Carbohydrate Consistent Diet] : carbohydrate consistent diet [Diabetes Foot Care] : diabetes foot care [Long Term Vascular Complications] : long term vascular complications of diabetes [Self Monitoring of Blood Glucose] : self monitoring of blood glucose [FreeTextEntry1] : 67 y.o. female with h/o hypothyroidism, thyroid nodule, Type 2 DM, adrenal nodule and osteopenia.\par \par 1. Hypothyroidism- Will check TFTs and adjust Synthroid accordingly.\par \par 2. Thyroid nodule- Will check thyroid ultrasound in 2024 given nodule stability. Will continue to monitor.\par \par 3. Type 2 DM- Discussed pathophysiology and reviewed Hba1c and blood glucose goals. Good control with Hba1c of 6.2% today. Will continue Metformin 500 mg daily. Encouraged a carbohydrate consistent diet and exercise. Will check CMP and urine alb/cr ratio. UTD with ophthalmology. \par \par 4. Adrenal nodule- Hormonal work up is normal. Will repeat CT scan in 2024.\par \par 5. Osteopenia- DEXA scan performed in April 2021 shows spine -1.3 which is stable and left femoral neck -1.9 with 20% decrease however total hip 0.8 which is stable. Patient is at average risk of fracture. Recommend monitoring and repeat DEXA scan in in 2023. Encouraged weight bearing activity. Discussed appropriate calcium and vitamin D intake. Will check 25 vitamin D level.\par \par 6. Vitamin B12 def- Will check CBC and vitamin B12 \par \par Follow up in 4 to 6 months

## 2022-12-07 NOTE — PHYSICAL EXAM
[Alert] : alert [No Acute Distress] : no acute distress [Normal Sclera/Conjunctiva] : normal sclera/conjunctiva [EOMI] : extra ocular movement intact [No LAD] : no lymphadenopathy [No Respiratory Distress] : no respiratory distress [Clear to Auscultation] : lungs were clear to auscultation bilaterally [Normal S1, S2] : normal S1 and S2 [Regular Rhythm] : with a regular rhythm [Normal Bowel Sounds] : normal bowel sounds [Not Tender] : non-tender [Not Distended] : not distended [Soft] : abdomen soft [Normal Anterior Cervical Nodes] : no anterior cervical lymphadenopathy [No Spinal Tenderness] : no spinal tenderness [No Stigmata of Cushings Syndrome] : no stigmata of Cushings Syndrome [No Clubbing, Cyanosis] : no clubbing  or cyanosis of the fingernails [No Rash] : no rash [Right foot was examined, including] : right foot ~C was examined, including visual inspection with sensory and pulse exams [Left foot was examined, including] : left foot ~C was examined, including visual inspection with sensory and pulse exams [Normal] : normal [2+] : 2+ in the dorsalis pedis [Normal Reflexes] : deep tendon reflexes were 2+ and symmetric [Normal Affect] : the affect was normal [Normal Mood] : the mood was normal [Kyphosis] : no kyphosis present [Abdominal Striae] : no abdominal striae [Acanthosis Nigricans] : no acanthosis nigricans [Diminished Throughout Both Feet] : normal tactile sensation with monofilament testing throughout both feet [de-identified] : left thyroid lobe nodule [de-identified] : mild edema b/l [FreeTextEntry1] : callus [FreeTextEntry5] : callus

## 2022-12-07 NOTE — HISTORY OF PRESENT ILLNESS
[FreeTextEntry1] : 67 y.o.female with h/o hypothyroidism, Type 2 DM diagnosed in June 2020, adrenal nodule and thyroid nodule here for follow up visit. Had fall on November 11th 2016 and broke right collar bone. No neck complaints. Was taking [place] thyroid 30 mg daily and switched to Synthroid 75 mcg daily 6 days per week. She developed hives with generic Synthroid. Had FNA in March 2014 of left dominant 3.9 cm nodule c/w adenomatous nodular goiter. Thyroid ultrasound in January 2017 showed stable 3.7 cm left complex thyroid nodule. Thyroid ultrasound in May 2021 showed stable left mid to lower pole nodule measuring 3.5 cm. No abnormal LNs are seen. C/o fatigue. Reports cold intolerance. Normal bowel movements. No hair loss and no skin complaints.  C/o right low back pain. Tried PT but no help. \par \par Also has Type 2 DM diagnosed in June 2020. Taking metformin 500 mg po daily (reduced dose because felt dizzy). Does intermittent fasting. Reports watches diet and did lose weight. No exercise. No bread. UTD with ophthalmology in March 2022 and no retinopathy. 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 No foot complaints. No proteinuria. \par \par Also diagnosed with right adrenal nodule measuring 4.3 cm c/w adrenal myolipoma on MRI from 5/8/15 and normal hormonal work up in 2015 and 2018. Had CT scan in 2/2017 showed stable right adrenal adenoma 3.5 cm. She had CT scan in May 2021 showed stable right 3.5 cm adrenal lesion consistent with myelolipoma and stable right 1.4 cm cortical adenoma. \par \par Also h/o osteopenia, no falls or fractures recently. She is taking vitamin D3 ?IU daily. Eats leafy greens. No dairy.  \par \par DEXA scan performed in 12/2016 shows spine -1.3 and left femoral neck -0.4 with total hip 0.9.\par DEXA scan performed in 4/2021 shows spine -1.3 which is stable and left femoral neck -1.9 with 20% decrease however total hip 0.8 is stable.

## 2022-12-07 NOTE — REVIEW OF SYSTEMS
[Fatigue] : fatigue [Recent Weight Loss (___ Lbs)] : recent weight loss: [unfilled] lbs [Back Pain] : back pain [Cold Intolerance] : cold intolerance [Swelling] : swelling [Negative] : Neurological [Recent Weight Gain (___ Lbs)] : no recent weight gain [Polyuria] : no polyuria [Hair Loss] : no hair loss [Polydipsia] : no polydipsia [FreeTextEntry3] : decrease in vision

## 2022-12-09 LAB
25(OH)D3 SERPL-MCNC: 30.4 NG/ML
ALBUMIN SERPL ELPH-MCNC: 4.2 G/DL
ALP BLD-CCNC: 98 U/L
ALT SERPL-CCNC: 80 U/L
ANION GAP SERPL CALC-SCNC: 11 MMOL/L
AST SERPL-CCNC: 55 U/L
BASOPHILS # BLD AUTO: 0.04 K/UL
BASOPHILS NFR BLD AUTO: 0.5 %
BILIRUB SERPL-MCNC: 0.4 MG/DL
BUN SERPL-MCNC: 13 MG/DL
CALCIUM SERPL-MCNC: 10.2 MG/DL
CHLORIDE SERPL-SCNC: 104 MMOL/L
CHOLEST SERPL-MCNC: 236 MG/DL
CO2 SERPL-SCNC: 26 MMOL/L
CREAT SERPL-MCNC: 0.53 MG/DL
CREAT SPEC-SCNC: 102 MG/DL
EGFR: 102 ML/MIN/1.73M2
EOSINOPHIL # BLD AUTO: 0.15 K/UL
EOSINOPHIL NFR BLD AUTO: 1.7 %
FOLATE SERPL-MCNC: >20 NG/ML
GLUCOSE SERPL-MCNC: 93 MG/DL
HCT VFR BLD CALC: 46.3 %
HDLC SERPL-MCNC: 65 MG/DL
HGB BLD-MCNC: 14.6 G/DL
IMM GRANULOCYTES NFR BLD AUTO: 0.2 %
LDLC SERPL CALC-MCNC: 151 MG/DL
LYMPHOCYTES # BLD AUTO: 3.41 K/UL
LYMPHOCYTES NFR BLD AUTO: 39.1 %
MAN DIFF?: NORMAL
MCHC RBC-ENTMCNC: 29.6 PG
MCHC RBC-ENTMCNC: 31.5 GM/DL
MCV RBC AUTO: 93.9 FL
MICROALBUMIN 24H UR DL<=1MG/L-MCNC: 3.4 MG/DL
MICROALBUMIN/CREAT 24H UR-RTO: 34 MG/G
MONOCYTES # BLD AUTO: 0.51 K/UL
MONOCYTES NFR BLD AUTO: 5.8 %
NEUTROPHILS # BLD AUTO: 4.59 K/UL
NEUTROPHILS NFR BLD AUTO: 52.7 %
NONHDLC SERPL-MCNC: 171 MG/DL
PLATELET # BLD AUTO: 216 K/UL
POTASSIUM SERPL-SCNC: 4.1 MMOL/L
PROT SERPL-MCNC: 7.2 G/DL
RBC # BLD: 4.93 M/UL
RBC # FLD: 14.5 %
SODIUM SERPL-SCNC: 141 MMOL/L
T4 FREE SERPL-MCNC: 1.5 NG/DL
THYROGLOB AB SERPL-ACNC: <20 IU/ML
THYROPEROXIDASE AB SERPL IA-ACNC: 18 IU/ML
TRIGL SERPL-MCNC: 100 MG/DL
TSH SERPL-ACNC: 0.47 UIU/ML
VIT B12 SERPL-MCNC: 1085 PG/ML
WBC # FLD AUTO: 8.72 K/UL

## 2023-06-28 ENCOUNTER — APPOINTMENT (OUTPATIENT)
Dept: ENDOCRINOLOGY | Facility: CLINIC | Age: 68
End: 2023-06-28
Payer: MEDICARE

## 2023-06-28 VITALS
HEART RATE: 80 BPM | OXYGEN SATURATION: 98 % | SYSTOLIC BLOOD PRESSURE: 142 MMHG | BODY MASS INDEX: 48.91 KG/M2 | DIASTOLIC BLOOD PRESSURE: 70 MMHG | HEIGHT: 58 IN | WEIGHT: 233 LBS

## 2023-06-28 VITALS — SYSTOLIC BLOOD PRESSURE: 160 MMHG | DIASTOLIC BLOOD PRESSURE: 80 MMHG

## 2023-06-28 PROCEDURE — 99215 OFFICE O/P EST HI 40 MIN: CPT | Mod: 25

## 2023-06-28 PROCEDURE — 77080 DXA BONE DENSITY AXIAL: CPT | Mod: GA

## 2023-06-28 PROCEDURE — ZZZZZ: CPT

## 2023-06-28 NOTE — REASON FOR VISIT
[Follow - Up] : a follow-up visit [Adrenal Evaluation/Adrenal Disorder] : adrenal evaluation/adrenal disorder [DM Type 2] : DM Type 2 [Hypothyroidism] : hypothyroidism [Thyroid nodule/ MNG] : thyroid nodule/ MNG [Osteoporosis] : osteoporosis

## 2023-06-28 NOTE — ASSESSMENT
[Carbohydrate Consistent Diet] : carbohydrate consistent diet [Diabetes Foot Care] : diabetes foot care [Long Term Vascular Complications] : long term vascular complications of diabetes [Self Monitoring of Blood Glucose] : self monitoring of blood glucose [FreeTextEntry1] : 68 y.o. female with h/o hypothyroidism, thyroid nodule, Type 2 DM, adrenal nodule, osteoporosis and HTN.\par \par 1. Hypothyroidism- Will check TFTs and adjust Synthroid accordingly.\par \par 2. Thyroid nodule- Will check thyroid ultrasound in 2024 given nodule stability. Will continue to monitor.\par \par 3. Type 2 DM- Discussed pathophysiology and reviewed Hba1c and blood glucose goals. Good control with Hba1c of 6.2% in 12/2022. Will continue Metformin 500 mg daily. Encouraged a carbohydrate consistent diet and exercise. Will check Hba1c, CMP and urine alb/cr ratio. Follow up with ophthalmology. \par \par 4. Adrenal nodule- Hormonal work up is normal. Will repeat CT scan in 2024.\par \par 5. Osteoporosis- DEXA scan performed today shows spine -0.9 with degenerative changes and left femoral neck -2.6 with 13% decrease however total hip 0.2 with 8% decrease and 1/3 radius -1.8. Patient is at increased risk of fracture. Recommend medical therapy with oral bisphosphonates. Reviewed risks and benefits of oral bisphosphonates. She declines medical therapy at this time. Will continue to monitor and repeat DEXA scan in in 2025. Encouraged weight bearing activity. Discussed appropriate calcium and vitamin D intake. Will check 25 vitamin D level.\par \par 6. Vitamin B12 def- Will check CBC and vitamin B12. Will continue supplement. \par \par 7. HTN- BP is elevated and will increase Losartan to 50 mg po daily.\par \par 8. Hyperlipidemia- Will check lipids. Encouraged a low fat diet and exercise. Discussed starting statin and patient declines given side effect concerns. \par \par Follow up in 4 to 6 months [Importance of Diet and Exercise] : importance of diet and exercise to improve glycemic control, achieve weight loss and improve cardiovascular health [Bisphosphonate Therapy] : Risks  and benefits of bisphosphonate therapy were  discussed with the patient including gastroesophageal irritation, osteonecrosis of the jaw, and atypical femur fractures, and acute phase reaction

## 2023-06-28 NOTE — PHYSICAL EXAM
[Alert] : alert [No Acute Distress] : no acute distress [Normal Sclera/Conjunctiva] : normal sclera/conjunctiva [No LAD] : no lymphadenopathy [EOMI] : extra ocular movement intact [No Respiratory Distress] : no respiratory distress [Clear to Auscultation] : lungs were clear to auscultation bilaterally [Normal S1, S2] : normal S1 and S2 [Regular Rhythm] : with a regular rhythm [Normal Bowel Sounds] : normal bowel sounds [Not Tender] : non-tender [Not Distended] : not distended [Soft] : abdomen soft [Normal Anterior Cervical Nodes] : no anterior cervical lymphadenopathy [No Spinal Tenderness] : no spinal tenderness [No Stigmata of Cushings Syndrome] : no stigmata of Cushings Syndrome [No Clubbing, Cyanosis] : no clubbing  or cyanosis of the fingernails [No Rash] : no rash [Right foot was examined, including] : right foot ~C was examined, including visual inspection with sensory and pulse exams [Left foot was examined, including] : left foot ~C was examined, including visual inspection with sensory and pulse exams [Normal] : normal [2+] : 2+ in the dorsalis pedis [Normal Reflexes] : deep tendon reflexes were 2+ and symmetric [Normal Affect] : the affect was normal [Normal Mood] : the mood was normal [Kyphosis] : no kyphosis present [Acanthosis Nigricans] : no acanthosis nigricans [Diminished Throughout Both Feet] : normal tactile sensation with monofilament testing throughout both feet [de-identified] : left thyroid lobe nodule is palpable [de-identified] : mild edema b/l [FreeTextEntry1] : callus [FreeTextEntry5] : callus

## 2023-06-28 NOTE — REVIEW OF SYSTEMS
[Fatigue] : fatigue [Back Pain] : back pain [Cold Intolerance] : cold intolerance [Swelling] : swelling [Negative] : Neurological [Recent Weight Gain (___ Lbs)] : no recent weight gain [Recent Weight Loss (___ Lbs)] : no recent weight loss [Polyuria] : no polyuria [Hair Loss] : no hair loss [Polydipsia] : no polydipsia [FreeTextEntry3] : decrease in vision

## 2023-06-28 NOTE — HISTORY OF PRESENT ILLNESS
[FreeTextEntry1] : 68 y.o.female with h/o hypothyroidism, Type 2 DM diagnosed in June 2020, adrenal nodule, thyroid nodule and osteoporosis diagnosed in June 2023 here for follow up visit. Had fall on November 11th 2016 and broke right collar bone.\par \par In regards to hypothyroidism and thyroid nodule, no neck complaints. Was taking [place] thyroid 30 mg daily and switched to Synthroid 75 mcg daily 6 days per week. She developed hives with generic Synthroid. Had FNA in March 2014 of left dominant 3.9 cm nodule c/w adenomatous nodular goiter. Thyroid ultrasound in January 2017 showed stable 3.7 cm left complex thyroid nodule. Thyroid ultrasound in May 2021 showed stable left mid to lower pole nodule measuring 3.5 cm. No abnormal LNs are seen. C/o fatigue. Reports cold intolerance. Normal bowel movements. No hair loss and no skin complaints.  C/o right low back pain. Tried PT but no help. \par \par In regards to Type 2 DM, this was diagnosed in June 2020. Not monitoring FS at home. Taking metformin 500 mg po daily (reduced dose because felt dizzy). Does intermittent fasting. Reports watches diet and did lose weight. No exercise. No bread. Eating vegetables from her garden. UTD with ophthalmology in March 2022 (due for now) and no retinopathy. 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 No foot complaints. Does have proteinuria and takes Losartan 25 mg daily \par \par Also diagnosed with right adrenal nodule measuring 4.3 cm c/w adrenal myolipoma on MRI from 5/8/15 and normal hormonal work up in 2015 and 2018. Had CT scan in 2/2017 showed stable right adrenal adenoma 3.5 cm. She had CT scan in May 2021 showed stable right 3.5 cm adrenal lesion consistent with myelolipoma and stable right 1.4 cm cortical adenoma. \par \par In regards to osteoporosis, no falls or fractures recently. She is taking vitamin D3 ?IU every few days. Eats leafy greens. No dairy.  \par \par DEXA scan performed in 12/2016 shows spine -1.3 and left femoral neck -0.4 with total hip 0.9.\par DEXA scan performed in 4/2021 shows spine -1.3 which is stable and left femoral neck -1.9 with 20% decrease however total hip 0.8 is stable. \par DEXA scan performed in June 2023 shows spine -0.9 with degenerative changes, left femoral neck -2.6 with 13% decrease and total hip 0.2 with 8% decrease, and 1/3 radius -1.8

## 2023-06-30 LAB
25(OH)D3 SERPL-MCNC: 25.5 NG/ML
ALBUMIN SERPL ELPH-MCNC: 4.3 G/DL
ALP BLD-CCNC: 83 U/L
ALT SERPL-CCNC: 52 U/L
ANION GAP SERPL CALC-SCNC: 13 MMOL/L
AST SERPL-CCNC: 42 U/L
BILIRUB SERPL-MCNC: 0.3 MG/DL
BUN SERPL-MCNC: 12 MG/DL
CALCIUM SERPL-MCNC: 10.4 MG/DL
CHLORIDE SERPL-SCNC: 106 MMOL/L
CHOLEST SERPL-MCNC: 228 MG/DL
CO2 SERPL-SCNC: 23 MMOL/L
CREAT SERPL-MCNC: 0.51 MG/DL
CREAT SPEC-SCNC: 51 MG/DL
EGFR: 102 ML/MIN/1.73M2
ESTIMATED AVERAGE GLUCOSE: 146 MG/DL
FOLATE SERPL-MCNC: >20 NG/ML
GLUCOSE SERPL-MCNC: 95 MG/DL
HBA1C MFR BLD HPLC: 6.7 %
HDLC SERPL-MCNC: 60 MG/DL
LDLC SERPL CALC-MCNC: 147 MG/DL
MICROALBUMIN 24H UR DL<=1MG/L-MCNC: 1.7 MG/DL
MICROALBUMIN/CREAT 24H UR-RTO: 33 MG/G
NONHDLC SERPL-MCNC: 167 MG/DL
POTASSIUM SERPL-SCNC: 4.8 MMOL/L
PROT SERPL-MCNC: 6.7 G/DL
SODIUM SERPL-SCNC: 141 MMOL/L
T4 FREE SERPL-MCNC: 1.3 NG/DL
TRIGL SERPL-MCNC: 99 MG/DL
TSH SERPL-ACNC: 0.37 UIU/ML
VIT B12 SERPL-MCNC: 908 PG/ML

## 2023-09-21 RX ORDER — LEVOTHYROXINE SODIUM 75 UG/1
75 TABLET ORAL
Qty: 90 | Refills: 3 | Status: ACTIVE | COMMUNITY
Start: 2018-03-23 | End: 1900-01-01

## 2024-01-03 ENCOUNTER — APPOINTMENT (OUTPATIENT)
Dept: ENDOCRINOLOGY | Facility: CLINIC | Age: 69
End: 2024-01-03
Payer: MEDICARE

## 2024-01-03 VITALS
OXYGEN SATURATION: 96 % | HEIGHT: 58 IN | BODY MASS INDEX: 50.38 KG/M2 | HEART RATE: 84 BPM | DIASTOLIC BLOOD PRESSURE: 80 MMHG | SYSTOLIC BLOOD PRESSURE: 160 MMHG | WEIGHT: 240 LBS

## 2024-01-03 VITALS — SYSTOLIC BLOOD PRESSURE: 160 MMHG | DIASTOLIC BLOOD PRESSURE: 80 MMHG

## 2024-01-03 LAB — HBA1C MFR BLD HPLC: 7.1

## 2024-01-03 PROCEDURE — 83036 HEMOGLOBIN GLYCOSYLATED A1C: CPT | Mod: QW

## 2024-01-03 PROCEDURE — 99215 OFFICE O/P EST HI 40 MIN: CPT | Mod: 25

## 2024-01-03 RX ORDER — LOSARTAN POTASSIUM 100 MG/1
100 TABLET, FILM COATED ORAL DAILY
Qty: 90 | Refills: 3 | Status: ACTIVE | COMMUNITY
Start: 2023-06-28 | End: 1900-01-01

## 2024-01-03 RX ORDER — METFORMIN HYDROCHLORIDE 500 MG/1
500 TABLET, COATED ORAL
Qty: 180 | Refills: 3 | Status: ACTIVE | COMMUNITY
Start: 2020-06-08 | End: 1900-01-01

## 2024-01-03 NOTE — HISTORY OF PRESENT ILLNESS
[FreeTextEntry1] : 68 y.o.female with h/o hypothyroidism, Type 2 DM diagnosed in June 2020, adrenal nodule, thyroid nodule and osteoporosis diagnosed in June 2023 here for follow up visit. Had fall on November 11th 2016 and broke right collar bone.  In regard to hypothyroidism and thyroid nodule, no neck complaints. Was taking [place] thyroid 30 mg daily and switched to Synthroid 75 mcg daily 6 days per week. She developed hives with generic Synthroid.   Had FNA in March 2014 of left dominant 3.9 cm nodule c/w adenomatous nodular goiter.  Thyroid ultrasound in January 2017 showed stable 3.7 cm left complex thyroid nodule.  Thyroid ultrasound in May 2021 showed stable left mid to lower pole nodule measuring 3.5 cm. No abnormal LNs are seen.  C/o fatigue. Reports cold intolerance. Normal bowel movements. No hair loss and no skin complaints.  C/o right low back pain. Tried PT but no help.   In regard to Type 2 DM, this was diagnosed in June 2020. Not monitoring FS at home. Taking metformin 500 mg po daily (reduced dose because felt dizzy). No exercise. Eating worse with the holidays.   UTD with ophthalmology in November 2023 and no retinopathy. 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 No foot complaints. Does have proteinuria and takes Losartan 50 mg daily.    Also diagnosed with right adrenal nodule measuring 4.3 cm c/w adrenal myolipoma on MRI from 5/8/15 and normal hormonal work up in 2015 and 2018. Had CT scan in 2/2017 showed stable right adrenal adenoma 3.5 cm. She had CT scan in May 2021 showed stable right 3.5 cm adrenal lesion consistent with myelolipoma and stable right 1.4 cm cortical adenoma.   In regard to osteoporosis, no falls or fractures recently. She is taking vitamin D3 ?IU  with K2. Eats leafy greens. No dairy.  Patient has declined medical therapy given side effect concerns.   DEXA scan performed in 12/2016 shows spine -1.3 and left femoral neck -0.4 with total hip 0.9. DEXA scan performed in 4/2021 shows spine -1.3 which is stable and left femoral neck -1.9 with 20% decrease however total hip 0.8 is stable.  DEXA scan performed in June 2023 shows spine -0.9 with degenerative changes, left femoral neck -2.6 with 13% decrease and total hip 0.2 with 8% decrease, and 1/3 radius -1.8

## 2024-01-03 NOTE — PHYSICAL EXAM
[Alert] : alert [No Acute Distress] : no acute distress [Normal Sclera/Conjunctiva] : normal sclera/conjunctiva [EOMI] : extra ocular movement intact [No LAD] : no lymphadenopathy [No Respiratory Distress] : no respiratory distress [Clear to Auscultation] : lungs were clear to auscultation bilaterally [Normal S1, S2] : normal S1 and S2 [Regular Rhythm] : with a regular rhythm [Normal Bowel Sounds] : normal bowel sounds [Not Tender] : non-tender [Not Distended] : not distended [Soft] : abdomen soft [Normal Anterior Cervical Nodes] : no anterior cervical lymphadenopathy [No Spinal Tenderness] : no spinal tenderness [No Stigmata of Cushings Syndrome] : no stigmata of Cushings Syndrome [No Clubbing, Cyanosis] : no clubbing  or cyanosis of the fingernails [No Rash] : no rash [Right foot was examined, including] : right foot ~C was examined, including visual inspection with sensory and pulse exams [Left foot was examined, including] : left foot ~C was examined, including visual inspection with sensory and pulse exams [Normal] : normal [2+] : 2+ in the dorsalis pedis [Normal Reflexes] : deep tendon reflexes were 2+ and symmetric [Normal Affect] : the affect was normal [Normal Mood] : the mood was normal [Kyphosis] : no kyphosis present [Acanthosis Nigricans] : no acanthosis nigricans [Diminished Throughout Both Feet] : normal tactile sensation with monofilament testing throughout both feet [de-identified] : left thyroid lobe nodule is palpable [de-identified] : mild edema b/l [FreeTextEntry1] : callus [FreeTextEntry5] : callus

## 2024-01-03 NOTE — ASSESSMENT
[Carbohydrate Consistent Diet] : carbohydrate consistent diet [Diabetes Foot Care] : diabetes foot care [Long Term Vascular Complications] : long term vascular complications of diabetes [Importance of Diet and Exercise] : importance of diet and exercise to improve glycemic control, achieve weight loss and improve cardiovascular health [Self Monitoring of Blood Glucose] : self monitoring of blood glucose [Bisphosphonate Therapy] : Risks  and benefits of bisphosphonate therapy were  discussed with the patient including gastroesophageal irritation, osteonecrosis of the jaw, and atypical femur fractures, and acute phase reaction [FreeTextEntry1] : 68 y.o. female with h/o hypothyroidism, thyroid nodule, Type 2 DM, adrenal nodule, osteoporosis and HTN.  1. Hypothyroidism- Will check TFTs and adjust Synthroid accordingly.  2. Thyroid nodule- Will check thyroid ultrasound in 2024 given nodule stability. Will continue to monitor.  3. Type 2 DM- Discussed pathophysiology and reviewed Hba1c and blood glucose goals. Fair control with Hba1c of 7.1% today. Will increase Metformin to 500 mg BID. We discussed starting a GLP-1 agonist and she declines given side effects concerns. Encouraged a carbohydrate consistent diet and exercise. Will check CMP and urine alb/cr ratio.   4. Adrenal nodule- Hormonal work up is normal. Will repeat CT scan in 2024.  5. Osteoporosis- DEXA scan performed in June 2023 shows spine -0.9 with degenerative changes and left femoral neck -2.6 with 13% decrease however total hip 0.2 with 8% decrease and 1/3 radius -1.8. Patient is at increased risk of fracture. Recommend medical therapy with oral bisphosphonates. Reviewed risks and benefits of oral bisphosphonates. She declines medical therapy at this time. Will continue to monitor and repeat DEXA scan in in 2025. Encouraged weight bearing activity. Discussed appropriate calcium and vitamin D intake. Will check 25 vitamin D level.  6. Vitamin B12 def- Will check CBC and vitamin B12. Will continue supplement.  7. HTN- BP is elevated and will increase Losartan to 100 mg po daily. Recommend cardiology evaluation.   8. Hyperlipidemia- Will check lipids. Encouraged a low-fat diet and exercise. Discussed starting statin and patient declines given side effect concerns.    Follow up in 4 to 6 months.

## 2024-01-03 NOTE — REASON FOR VISIT
[Follow - Up] : a follow-up visit [Adrenal Evaluation/Adrenal Disorder] : adrenal evaluation/adrenal disorder [DM Type 2] : DM Type 2 [Hypothyroidism] : hypothyroidism [Osteoporosis] : osteoporosis [Thyroid nodule/ MNG] : thyroid nodule/ MNG

## 2024-01-05 LAB
25(OH)D3 SERPL-MCNC: 53.9 NG/ML
ALBUMIN SERPL ELPH-MCNC: 4.4 G/DL
ALDOSTERONE SERUM: 10.3 NG/DL
ALP BLD-CCNC: 93 U/L
ALT SERPL-CCNC: 62 U/L
ANION GAP SERPL CALC-SCNC: 12 MMOL/L
AST SERPL-CCNC: 39 U/L
BILIRUB SERPL-MCNC: 0.4 MG/DL
BUN SERPL-MCNC: 17 MG/DL
CALCIUM SERPL-MCNC: 10.2 MG/DL
CHLORIDE SERPL-SCNC: 103 MMOL/L
CHOLEST SERPL-MCNC: 226 MG/DL
CO2 SERPL-SCNC: 24 MMOL/L
CREAT SERPL-MCNC: 0.52 MG/DL
CREAT SPEC-SCNC: 27 MG/DL
EGFR: 102 ML/MIN/1.73M2
GLUCOSE SERPL-MCNC: 112 MG/DL
HCT VFR BLD CALC: 42.5 %
HDLC SERPL-MCNC: 67 MG/DL
HGB BLD-MCNC: 13.7 G/DL
LDLC SERPL CALC-MCNC: 144 MG/DL
MCHC RBC-ENTMCNC: 28.5 PG
MCHC RBC-ENTMCNC: 32.2 GM/DL
MCV RBC AUTO: 88.5 FL
MICROALBUMIN 24H UR DL<=1MG/L-MCNC: <1.2 MG/DL
MICROALBUMIN/CREAT 24H UR-RTO: NORMAL MG/G
NONHDLC SERPL-MCNC: 160 MG/DL
PLATELET # BLD AUTO: 229 K/UL
POTASSIUM SERPL-SCNC: 5.3 MMOL/L
PROT SERPL-MCNC: 7 G/DL
RBC # BLD: 4.8 M/UL
RBC # FLD: 14.7 %
SODIUM SERPL-SCNC: 140 MMOL/L
T4 FREE SERPL-MCNC: 1.4 NG/DL
TRIGL SERPL-MCNC: 92 MG/DL
TSH SERPL-ACNC: 0.39 UIU/ML
WBC # FLD AUTO: 8.47 K/UL

## 2024-01-15 LAB
METANEPHRINE, PL: <25 PG/ML
NORMETANEPHRINE, PL: 98.8 PG/ML

## 2024-06-21 ENCOUNTER — APPOINTMENT (OUTPATIENT)
Dept: ENDOCRINOLOGY | Facility: CLINIC | Age: 69
End: 2024-06-21
Payer: MEDICARE

## 2024-06-21 VITALS
HEART RATE: 84 BPM | WEIGHT: 222 LBS | BODY MASS INDEX: 46.6 KG/M2 | OXYGEN SATURATION: 98 % | SYSTOLIC BLOOD PRESSURE: 170 MMHG | HEIGHT: 58 IN | DIASTOLIC BLOOD PRESSURE: 70 MMHG

## 2024-06-21 VITALS — SYSTOLIC BLOOD PRESSURE: 160 MMHG | DIASTOLIC BLOOD PRESSURE: 80 MMHG

## 2024-06-21 DIAGNOSIS — E03.9 HYPOTHYROIDISM, UNSPECIFIED: ICD-10-CM

## 2024-06-21 DIAGNOSIS — I10 ESSENTIAL (PRIMARY) HYPERTENSION: ICD-10-CM

## 2024-06-21 DIAGNOSIS — E11.9 TYPE 2 DIABETES MELLITUS W/OUT COMPLICATIONS: ICD-10-CM

## 2024-06-21 DIAGNOSIS — M81.0 AGE-RELATED OSTEOPOROSIS W/OUT CURRENT PATHOLOGICAL FRACTURE: ICD-10-CM

## 2024-06-21 DIAGNOSIS — E53.8 DEFICIENCY OF OTHER SPECIFIED B GROUP VITAMINS: ICD-10-CM

## 2024-06-21 DIAGNOSIS — E27.8 OTHER SPECIFIED DISORDERS OF ADRENAL GLAND: ICD-10-CM

## 2024-06-21 DIAGNOSIS — E04.1 NONTOXIC SINGLE THYROID NODULE: ICD-10-CM

## 2024-06-21 DIAGNOSIS — E55.9 VITAMIN D DEFICIENCY, UNSPECIFIED: ICD-10-CM

## 2024-06-21 LAB
GLUCOSE BLDC GLUCOMTR-MCNC: 127
HBA1C MFR BLD HPLC: 6.2

## 2024-06-21 PROCEDURE — 82962 GLUCOSE BLOOD TEST: CPT

## 2024-06-21 PROCEDURE — 99215 OFFICE O/P EST HI 40 MIN: CPT

## 2024-06-21 PROCEDURE — 83036 HEMOGLOBIN GLYCOSYLATED A1C: CPT | Mod: QW

## 2024-06-21 PROCEDURE — G2211 COMPLEX E/M VISIT ADD ON: CPT

## 2024-06-21 RX ORDER — AMLODIPINE BESYLATE 5 MG/1
5 TABLET ORAL
Qty: 90 | Refills: 3 | Status: ACTIVE | COMMUNITY
Start: 2024-06-21 | End: 1900-01-01

## 2024-06-22 NOTE — REVIEW OF SYSTEMS
[Fatigue] : fatigue [Back Pain] : back pain [Cold Intolerance] : cold intolerance [Swelling] : swelling [Negative] : Neurological [Recent Weight Gain (___ Lbs)] : no recent weight gain [Recent Weight Loss (___ Lbs)] : recent weight loss: [unfilled] lbs [Polyuria] : no polyuria [Hair Loss] : no hair loss [Polydipsia] : no polydipsia [FreeTextEntry3] : decrease in vision

## 2024-06-22 NOTE — ASSESSMENT
[Carbohydrate Consistent Diet] : carbohydrate consistent diet [Diabetes Foot Care] : diabetes foot care [Long Term Vascular Complications] : long term vascular complications of diabetes [Importance of Diet and Exercise] : importance of diet and exercise to improve glycemic control, achieve weight loss and improve cardiovascular health [Self Monitoring of Blood Glucose] : self monitoring of blood glucose [Bisphosphonate Therapy] : Risks  and benefits of bisphosphonate therapy were  discussed with the patient including gastroesophageal irritation, osteonecrosis of the jaw, and atypical femur fractures, and acute phase reaction [FreeTextEntry1] : 69 y.o. female with h/o hypothyroidism, thyroid nodule, Type 2 DM, adrenal nodule, osteoporosis and HTN.  1. Hypothyroidism- Will check TFTs and adjust Synthroid accordingly.  2. Thyroid nodule- Will check thyroid ultrasound in 2025 given nodule stability. Will continue to monitor.  3. Type 2 DM- Discussed pathophysiology and reviewed Hba1c and blood glucose goals. Good control with Hba1c of 6.2% today. Will continue Metformin 500 mg BID. We discussed starting a GLP-1 RA and she declines given side effects concerns. Encouraged a carbohydrate consistent diet and exercise. Will check CMP and urine alb/cr ratio.   4. Adrenal nodule- Given stability, will continue to  monitor. Hormonal work up is normal. Will repeat CT scan in 2025.  5. Osteoporosis- DEXA scan performed in June 2023 shows spine -0.9 with degenerative changes and left femoral neck -2.6 with 13% decrease however total hip 0.2 with 8% decrease and 1/3 radius -1.8. Patient is at increased risk of fracture. Recommend medical therapy with oral bisphosphonates. Reviewed risks and benefits of oral bisphosphonates. She declines medical therapy at this time. Will continue to monitor and repeat DEXA scan in in 2025. Encouraged weight bearing activity. Discussed appropriate calcium and vitamin D intake. Will check 25 vitamin D level.  6. Vitamin B12 def- Will check CBC and vitamin B12. Will continue supplement.  7. HTN- BP is elevated and will start Amlodipine 5 mg daily and will continue Losartan 100 mg po daily. Recommend cardiology evaluation.   8. Hyperlipidemia- Will check lipids. Encouraged a low-fat diet and exercise. Discussed starting statin for CV risk reduction; however, patient declines given side effect concerns.    Follow up in 4 to 6 months.

## 2024-06-22 NOTE — HISTORY OF PRESENT ILLNESS
[FreeTextEntry1] : 69 y.o. female with h/o hypothyroidism, Type 2 DM diagnosed in June 2020, adrenal nodule, thyroid nodule and osteoporosis diagnosed in June 2023 here for follow up visit.   Had fall on November 11th, 2016, and broke right collar bone.  In regard to hypothyroidism and thyroid nodule, no neck complaints. Was taking [place] thyroid 30 mg daily and switched to Synthroid 75 mcg daily 6 days per week. She developed hives with generic Synthroid.   Had FNA in March 2014 of left dominant 3.9 cm nodule c/w adenomatous nodular goiter.  Thyroid ultrasound in January 2017 showed stable 3.7 cm left complex thyroid nodule.  Thyroid ultrasound in May 2021 showed stable left mid to lower pole nodule measuring 3.5 cm. No abnormal LNs are seen.    Less fatigue. Reports cold intolerance. Normal bowel movements. No hair loss and no skin complaints.  C/o right low back pain. Tried PT but no help. Did lose 20 pounds since her last visit.   In regard to Type 2 DM, this was diagnosed in June 2020. Not monitoring FS at home. Taking metformin 500 mg po BID. No exercise but very active and doing yard work/gardening and [place]. Eating better.    UTD with ophthalmology in November 2023 and no retinopathy. 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 No foot complaints. Does have proteinuria and takes Losartan 100 mg daily.    Also diagnosed with right adrenal nodule measuring 4.3 cm c/w adrenal myolipoma on MRI from 5/8/15 and normal hormonal work up in 2015, 2018 and 2024. Had CT scan in 2/2017 showed stable right adrenal adenoma 3.5 cm. She had CT scan in May 2021 showed stable right 3.5 cm adrenal lesion consistent with myelolipoma and stable right 1.4 cm cortical adenoma.   In regard to osteoporosis, no falls or fractures recently. She is taking vitamin D3 ?IU  with K2. Eats leafy greens. No dairy.  Patient has declined medical therapy given side effect concerns.   DEXA scan performed in 12/2016 shows spine -1.3 and left femoral neck -0.4 with total hip 0.9. DEXA scan performed in 4/2021 shows spine -1.3 which is stable and left femoral neck -1.9 with 20% decrease however total hip 0.8 is stable.  DEXA scan performed in June 2023 shows spine -0.9 with degenerative changes, left femoral neck -2.6 with 13% decrease and total hip 0.2 with 8% decrease, and 1/3 radius -1.8

## 2024-06-22 NOTE — PHYSICAL EXAM
[Alert] : alert [No Acute Distress] : no acute distress [Normal Sclera/Conjunctiva] : normal sclera/conjunctiva [EOMI] : extra ocular movement intact [No LAD] : no lymphadenopathy [No Respiratory Distress] : no respiratory distress [Clear to Auscultation] : lungs were clear to auscultation bilaterally [Normal S1, S2] : normal S1 and S2 [Regular Rhythm] : with a regular rhythm [Normal Bowel Sounds] : normal bowel sounds [Not Tender] : non-tender [Not Distended] : not distended [Soft] : abdomen soft [Normal Anterior Cervical Nodes] : no anterior cervical lymphadenopathy [No Spinal Tenderness] : no spinal tenderness [No Stigmata of Cushings Syndrome] : no stigmata of Cushings Syndrome [No Clubbing, Cyanosis] : no clubbing  or cyanosis of the fingernails [No Rash] : no rash [Right foot was examined, including] : right foot ~C was examined, including visual inspection with sensory and pulse exams [Left foot was examined, including] : left foot ~C was examined, including visual inspection with sensory and pulse exams [Normal] : normal [2+] : 2+ in the dorsalis pedis [Normal Reflexes] : deep tendon reflexes were 2+ and symmetric [Normal Affect] : the affect was normal [Normal Mood] : the mood was normal [Kyphosis] : no kyphosis present [Acanthosis Nigricans] : no acanthosis nigricans [Diminished Throughout Both Feet] : normal tactile sensation with monofilament testing throughout both feet [de-identified] : left thyroid lobe nodule is palpable [de-identified] : mild edema b/l [FreeTextEntry1] : callus [FreeTextEntry5] : callus

## 2024-06-24 LAB
25(OH)D3 SERPL-MCNC: 46.7 NG/ML
ALBUMIN SERPL ELPH-MCNC: 4.3 G/DL
ALP BLD-CCNC: 75 U/L
ALT SERPL-CCNC: 44 U/L
ANION GAP SERPL CALC-SCNC: 13 MMOL/L
AST SERPL-CCNC: 29 U/L
BILIRUB SERPL-MCNC: 0.3 MG/DL
BUN SERPL-MCNC: 14 MG/DL
CALCIUM SERPL-MCNC: 10.1 MG/DL
CHLORIDE SERPL-SCNC: 105 MMOL/L
CHOLEST SERPL-MCNC: 219 MG/DL
CO2 SERPL-SCNC: 22 MMOL/L
CREAT SERPL-MCNC: 0.55 MG/DL
CREAT SPEC-SCNC: 38 MG/DL
EGFR: 100 ML/MIN/1.73M2
FOLATE SERPL-MCNC: 12.1 NG/ML
GLUCOSE SERPL-MCNC: 106 MG/DL
HCT VFR BLD CALC: 40.6 %
HDLC SERPL-MCNC: 61 MG/DL
HGB BLD-MCNC: 13 G/DL
LDLC SERPL CALC-MCNC: 142 MG/DL
MCHC RBC-ENTMCNC: 27.6 PG
MCHC RBC-ENTMCNC: 32 GM/DL
MCV RBC AUTO: 86.2 FL
MICROALBUMIN 24H UR DL<=1MG/L-MCNC: <1.2 MG/DL
MICROALBUMIN/CREAT 24H UR-RTO: NORMAL MG/G
NONHDLC SERPL-MCNC: 158 MG/DL
PLATELET # BLD AUTO: 226 K/UL
POTASSIUM SERPL-SCNC: 4.6 MMOL/L
PROT SERPL-MCNC: 6.8 G/DL
RBC # BLD: 4.71 M/UL
RBC # FLD: 14.9 %
SODIUM SERPL-SCNC: 140 MMOL/L
T4 FREE SERPL-MCNC: 1.4 NG/DL
TRIGL SERPL-MCNC: 87 MG/DL
TSH SERPL-ACNC: 0.19 UIU/ML
VIT B12 SERPL-MCNC: 946 PG/ML
WBC # FLD AUTO: 7.45 K/UL

## 2024-07-25 ENCOUNTER — RX RENEWAL (OUTPATIENT)
Age: 69
End: 2024-07-25

## 2024-12-19 ENCOUNTER — RX RENEWAL (OUTPATIENT)
Age: 69
End: 2024-12-19

## 2024-12-27 ENCOUNTER — RX RENEWAL (OUTPATIENT)
Age: 69
End: 2024-12-27

## 2025-01-14 ENCOUNTER — APPOINTMENT (OUTPATIENT)
Dept: ENDOCRINOLOGY | Facility: CLINIC | Age: 70
End: 2025-01-14
Payer: MEDICARE

## 2025-01-14 VITALS
RESPIRATION RATE: 16 BRPM | WEIGHT: 230 LBS | DIASTOLIC BLOOD PRESSURE: 74 MMHG | BODY MASS INDEX: 48.28 KG/M2 | HEART RATE: 96 BPM | SYSTOLIC BLOOD PRESSURE: 166 MMHG | HEIGHT: 58 IN | OXYGEN SATURATION: 96 %

## 2025-01-14 VITALS — SYSTOLIC BLOOD PRESSURE: 150 MMHG | DIASTOLIC BLOOD PRESSURE: 70 MMHG

## 2025-01-14 DIAGNOSIS — E53.8 DEFICIENCY OF OTHER SPECIFIED B GROUP VITAMINS: ICD-10-CM

## 2025-01-14 DIAGNOSIS — E27.9 DISORDER OF ADRENAL GLAND, UNSPECIFIED: ICD-10-CM

## 2025-01-14 DIAGNOSIS — E78.5 HYPERLIPIDEMIA, UNSPECIFIED: ICD-10-CM

## 2025-01-14 DIAGNOSIS — E11.9 TYPE 2 DIABETES MELLITUS W/OUT COMPLICATIONS: ICD-10-CM

## 2025-01-14 DIAGNOSIS — M81.0 AGE-RELATED OSTEOPOROSIS W/OUT CURRENT PATHOLOGICAL FRACTURE: ICD-10-CM

## 2025-01-14 DIAGNOSIS — I10 ESSENTIAL (PRIMARY) HYPERTENSION: ICD-10-CM

## 2025-01-14 DIAGNOSIS — E03.9 HYPOTHYROIDISM, UNSPECIFIED: ICD-10-CM

## 2025-01-14 DIAGNOSIS — E04.1 NONTOXIC SINGLE THYROID NODULE: ICD-10-CM

## 2025-01-14 LAB — HBA1C MFR BLD HPLC: 6.1

## 2025-01-14 PROCEDURE — G2211 COMPLEX E/M VISIT ADD ON: CPT

## 2025-01-14 PROCEDURE — 83036 HEMOGLOBIN GLYCOSYLATED A1C: CPT | Mod: QW

## 2025-01-14 PROCEDURE — 99215 OFFICE O/P EST HI 40 MIN: CPT

## 2025-03-26 ENCOUNTER — RX RENEWAL (OUTPATIENT)
Age: 70
End: 2025-03-26

## 2025-06-27 ENCOUNTER — APPOINTMENT (OUTPATIENT)
Dept: ENDOCRINOLOGY | Facility: CLINIC | Age: 70
End: 2025-06-27
Payer: MEDICARE

## 2025-06-27 VITALS — BODY MASS INDEX: 47.44 KG/M2 | HEIGHT: 58 IN | WEIGHT: 226 LBS

## 2025-06-27 PROCEDURE — 77080 DXA BONE DENSITY AXIAL: CPT

## 2025-07-01 ENCOUNTER — APPOINTMENT (OUTPATIENT)
Dept: ENDOCRINOLOGY | Facility: CLINIC | Age: 70
End: 2025-07-01

## 2025-07-07 ENCOUNTER — APPOINTMENT (OUTPATIENT)
Dept: ENDOCRINOLOGY | Facility: CLINIC | Age: 70
End: 2025-07-07
Payer: MEDICARE

## 2025-07-07 VITALS
RESPIRATION RATE: 16 BRPM | WEIGHT: 226 LBS | HEIGHT: 58 IN | HEART RATE: 89 BPM | SYSTOLIC BLOOD PRESSURE: 145 MMHG | BODY MASS INDEX: 47.44 KG/M2 | DIASTOLIC BLOOD PRESSURE: 70 MMHG | OXYGEN SATURATION: 96 %

## 2025-07-07 VITALS — DIASTOLIC BLOOD PRESSURE: 70 MMHG | SYSTOLIC BLOOD PRESSURE: 120 MMHG

## 2025-07-07 LAB — HBA1C MFR BLD HPLC: 5.9

## 2025-07-07 PROCEDURE — 99215 OFFICE O/P EST HI 40 MIN: CPT

## 2025-07-07 PROCEDURE — 83036 HEMOGLOBIN GLYCOSYLATED A1C: CPT | Mod: QW

## 2025-07-07 PROCEDURE — G2211 COMPLEX E/M VISIT ADD ON: CPT

## 2025-07-07 RX ORDER — TIRZEPATIDE 2.5 MG/.5ML
2.5 INJECTION, SOLUTION SUBCUTANEOUS
Qty: 1 | Refills: 5 | Status: ACTIVE | COMMUNITY
Start: 2025-07-07 | End: 1900-01-01

## 2025-07-09 LAB
25(OH)D3 SERPL-MCNC: 51.1 NG/ML
ALBUMIN SERPL ELPH-MCNC: 4.2 G/DL
ALP BLD-CCNC: 92 U/L
ALT SERPL-CCNC: 48 U/L
ANION GAP SERPL CALC-SCNC: 12 MMOL/L
AST SERPL-CCNC: 31 U/L
BILIRUB SERPL-MCNC: 0.4 MG/DL
BUN SERPL-MCNC: 11 MG/DL
CALCIUM SERPL-MCNC: 10 MG/DL
CHLORIDE SERPL-SCNC: 105 MMOL/L
CHOLEST SERPL-MCNC: 217 MG/DL
CO2 SERPL-SCNC: 23 MMOL/L
CREAT SERPL-MCNC: 0.51 MG/DL
EGFRCR SERPLBLD CKD-EPI 2021: 101 ML/MIN/1.73M2
FOLATE SERPL-MCNC: 17.8 NG/ML
GLUCOSE SERPL-MCNC: 85 MG/DL
HCT VFR BLD CALC: 40 %
HDLC SERPL-MCNC: 59 MG/DL
HGB BLD-MCNC: 12.3 G/DL
LDLC SERPL-MCNC: 139 MG/DL
MCHC RBC-ENTMCNC: 27.5 PG
MCHC RBC-ENTMCNC: 30.8 G/DL
MCV RBC AUTO: 89.5 FL
NONHDLC SERPL-MCNC: 158 MG/DL
PLATELET # BLD AUTO: 223 K/UL
POTASSIUM SERPL-SCNC: 4.1 MMOL/L
PROT SERPL-MCNC: 6.8 G/DL
RBC # BLD: 4.47 M/UL
RBC # FLD: 15.7 %
SODIUM SERPL-SCNC: 140 MMOL/L
T4 FREE SERPL-MCNC: 1.4 NG/DL
TRIGL SERPL-MCNC: 105 MG/DL
TSH SERPL-ACNC: 0.23 UIU/ML
VIT B12 SERPL-MCNC: 949 PG/ML
WBC # FLD AUTO: 7.87 K/UL

## 2025-07-19 LAB — CORTIS SAL-MCNC: NORMAL

## 2025-07-30 RX ORDER — LANCETS 33 GAUGE
EACH MISCELLANEOUS
Qty: 1 | Refills: 3 | Status: ACTIVE | COMMUNITY
Start: 2025-07-30 | End: 1900-01-01

## 2025-07-30 RX ORDER — BLOOD SUGAR DIAGNOSTIC
STRIP MISCELLANEOUS
Qty: 1 | Refills: 3 | Status: ACTIVE | COMMUNITY
Start: 2025-07-30 | End: 1900-01-01

## 2025-07-30 RX ORDER — BLOOD-GLUCOSE METER
W/DEVICE EACH MISCELLANEOUS
Qty: 1 | Refills: 0 | Status: ACTIVE | COMMUNITY
Start: 2025-07-30 | End: 1900-01-01

## 2025-08-05 ENCOUNTER — OUTPATIENT (OUTPATIENT)
Dept: OUTPATIENT SERVICES | Facility: HOSPITAL | Age: 70
LOS: 1 days | End: 2025-08-05
Payer: MEDICARE

## 2025-08-05 ENCOUNTER — APPOINTMENT (OUTPATIENT)
Dept: CT IMAGING | Facility: HOSPITAL | Age: 70
End: 2025-08-05
Payer: MEDICARE

## 2025-08-05 ENCOUNTER — APPOINTMENT (OUTPATIENT)
Dept: ULTRASOUND IMAGING | Facility: HOSPITAL | Age: 70
End: 2025-08-05
Payer: MEDICARE

## 2025-08-05 DIAGNOSIS — E27.9 DISORDER OF ADRENAL GLAND, UNSPECIFIED: ICD-10-CM

## 2025-08-05 DIAGNOSIS — E04.1 NONTOXIC SINGLE THYROID NODULE: ICD-10-CM

## 2025-08-05 PROCEDURE — 74150 CT ABDOMEN W/O CONTRAST: CPT | Mod: 26

## 2025-08-05 PROCEDURE — 74150 CT ABDOMEN W/O CONTRAST: CPT

## 2025-08-05 PROCEDURE — 76536 US EXAM OF HEAD AND NECK: CPT | Mod: 26

## 2025-08-05 PROCEDURE — 76536 US EXAM OF HEAD AND NECK: CPT

## 2025-08-27 ENCOUNTER — APPOINTMENT (OUTPATIENT)
Dept: NEUROLOGY | Facility: CLINIC | Age: 70
End: 2025-08-27
Payer: MEDICARE

## 2025-08-27 VITALS
TEMPERATURE: 97.3 F | DIASTOLIC BLOOD PRESSURE: 82 MMHG | SYSTOLIC BLOOD PRESSURE: 158 MMHG | OXYGEN SATURATION: 97 % | HEART RATE: 58 BPM

## 2025-08-27 VITALS
BODY MASS INDEX: 47.44 KG/M2 | TEMPERATURE: 97.3 F | WEIGHT: 226 LBS | DIASTOLIC BLOOD PRESSURE: 82 MMHG | HEIGHT: 58 IN | HEART RATE: 58 BPM | SYSTOLIC BLOOD PRESSURE: 158 MMHG | OXYGEN SATURATION: 97 %

## 2025-08-27 DIAGNOSIS — M47.816 SPONDYLOSIS W/OUT MYELOPATHY OR RADICULOPATHY, LUMBAR REGION: ICD-10-CM

## 2025-08-27 DIAGNOSIS — R41.89 OTHER SYMPTOMS AND SIGNS INVOLVING COGNITIVE FUNCTIONS AND AWARENESS: ICD-10-CM

## 2025-08-27 DIAGNOSIS — G25.0 ESSENTIAL TREMOR: ICD-10-CM

## 2025-08-27 PROCEDURE — 99205 OFFICE O/P NEW HI 60 MIN: CPT

## 2025-08-27 PROCEDURE — G2211 COMPLEX E/M VISIT ADD ON: CPT
